# Patient Record
Sex: MALE | Race: BLACK OR AFRICAN AMERICAN | NOT HISPANIC OR LATINO | Employment: UNEMPLOYED | URBAN - METROPOLITAN AREA
[De-identification: names, ages, dates, MRNs, and addresses within clinical notes are randomized per-mention and may not be internally consistent; named-entity substitution may affect disease eponyms.]

---

## 2017-05-03 ENCOUNTER — HOSPITAL ENCOUNTER (EMERGENCY)
Facility: OTHER | Age: 32
Discharge: HOME | End: 2017-05-03
Attending: EMERGENCY MEDICINE
Payer: MEDICAID

## 2017-05-03 VITALS
HEART RATE: 97 BPM | HEIGHT: 71 IN | TEMPERATURE: 98 F | DIASTOLIC BLOOD PRESSURE: 101 MMHG | SYSTOLIC BLOOD PRESSURE: 172 MMHG | BODY MASS INDEX: 41.54 KG/M2 | RESPIRATION RATE: 15 BRPM | OXYGEN SATURATION: 95 % | WEIGHT: 296.75 LBS

## 2017-05-03 DIAGNOSIS — F22 DELUSIONS OF PARASITOSIS: Primary | ICD-10-CM

## 2017-05-03 PROCEDURE — 99283 EMERGENCY DEPT VISIT LOW MDM: CPT

## 2017-05-03 RX ORDER — HYDROXYZINE HYDROCHLORIDE 25 MG/1
25 TABLET, FILM COATED ORAL EVERY 4 HOURS PRN
Qty: 20 TABLET | Refills: 0 | Status: SHIPPED | OUTPATIENT
Start: 2017-05-03 | End: 2017-09-11

## 2017-05-03 RX ORDER — PERMETHRIN 50 MG/G
CREAM TOPICAL
Qty: 60 G | Refills: 1 | Status: SHIPPED | OUTPATIENT
Start: 2017-05-03 | End: 2017-09-11

## 2017-05-03 NOTE — ED AVS SNAPSHOT
[facility]  [address]  [place] [address]               JOSE Miller   5/3/2017  8:18 PM   ED    Description:  Male : 1985   Department:  [facility]           Your Care was Coordinated By:     Provider Role From To    Guadalupe Ngo MD Attending Provider 17 --    Kylee Beck PA-C Physician Assistant 17 --      Reason for Visit     Scabies           Diagnoses this Visit        Comments    Delusions of parasitosis    -  Primary       ED Disposition     None           To Do List           Follow-up Information     Follow up with [facility] In 2 days.    Specialties:  Behavioral Health, Psychiatry    Contact information:    [address] [id number] [phone]         These Medications        Disp Refills Start End    permethrin (ELIMITE) 5 % cream 60 g 1 5/3/2017     Apply from chin to toes. Wash off in 1 hour. Repeat in 1 week if symptoms continue    hydrOXYzine HCl (ATARAX) 25 MG tablet 20 tablet 0 5/3/2017     Take 1 tablet (25 mg total) by mouth every 4 (four) hours as needed for Itching. - Oral      [facility]     [facility] [place] [facility] Nurse Care Line -  Assistance  Unless otherwise directed by your provider, please contact [facility], our nurse care line that is available for  assistance.     Registered nurses in the [facility] provide: appointment scheduling, clinical advisement, health education, and other advisory services.  Call: [phone] (toll free)               Medications           Message regarding Medications     Verify the changes and/or additions to your medication regime listed below are the same as discussed with your clinician today.  If any of these changes or additions are incorrect, please notify your healthcare provider.        START taking these NEW medications        Refills    permethrin (ELIMITE) 5 % cream 1    Sig: Apply from chin  "to toes. Wash off in 1 hour. Repeat in 1 week if symptoms continue    Class: Print    hydrOXYzine HCl (ATARAX) 25 MG tablet 0    Sig: Take 1 tablet (25 mg total) by mouth every 4 (four) hours as needed for Itching.    Class: Print    Route: Oral           Verify that the below list of medications is an accurate representation of the medications you are currently taking.  If none reported, the list may be blank. If incorrect, please contact your healthcare provider. Carry this list with you in case of emergency.           Current Medications     hydrOXYzine HCl (ATARAX) 25 MG tablet Take 1 tablet (25 mg total) by mouth every 4 (four) hours as needed for Itching.    ibuprofen (ADVIL,MOTRIN) 800 MG tablet Take 1 tablet (800 mg total) by mouth every 6 (six) hours as needed for Pain.    permethrin (ELIMITE) 5 % cream Apply from chin to toes. Wash off in 1 hour. Repeat in 1 week if symptoms continue           Clinical Reference Information           Your Vitals Were     BP Pulse Temp Resp Height Weight    172/101 (BP Location: Left arm, Patient Position: Sitting) 97 97.8 °F (36.6 °C) (Oral) 15 5' 11" (1.803 m) 134.6 kg (296 lb 11.8 oz)    SpO2 BMI             95% 41.39 kg/m2         Allergies as of 5/3/2017     No Known Allergies      Immunizations Administered on Date of Encounter - 5/3/2017     None      ED Micro, Lab, POCT     None      ED Imaging Orders     None      Discharge References/Attachments     SCABIES (ENGLISH)      [facility] Sign-Up     Activating your [facility] account is as easy as 1-2-3!     1) Visit [email or web address], select Sign Up Now, enter this activation code and your date of birth, then select Next.  [email or web address]  Expires: 6/17/2017  8:27 PM      2) Create a username and password to use when you visit [facility] in the future and select a security question in case you lose your password and select Next.    3) Enter your e-mail address and click Sign Up!    Additional Information  If you have " questions, please e-mail [email or web address] or call [phone] to talk to our [facility] staff. Remember, [employer] is NOT to be used for urgent needs. For medical emergencies, dial 911.         Smoking Cessation     If you would like to quit smoking:   You may be eligible for free services if you are a [place] resident and started smoking cigarettes before September 1, 1988.  Call the Smoking Cessation Trust (SCT) toll free at (790) 084-1294 or (704) 249-0952.   Call 8-459-QUIT-NOW if you do not meet the above criteria.   Contact us via email: [email or web address]   View our website for more information: [email or web address]         [facility] complies with applicable Federal civil rights laws and does not discriminate on the basis of race, color, national origin, age, disability, or sex.        Language Assistance Services     ATTENTION: Language assistance services are available, free of charge. Please call [phone].      ATENCIÓN: Si habla español, tiene a rivas disposición servicios gratuitos de asistencia lingüística. Llame al [phone].     CHÚ Ý: N?u b?n nói Ti?ng Vi?t, có các d?ch v? h? tr? ngôn ng? mi?n phí dành cho b?n. G?i s? [phone].

## 2017-05-04 NOTE — ED TRIAGE NOTES
Pt reports feeling bugs crawling on him. Pt reports bought a lice treatment and saw little black bugs crawling after. Pt reports having a bath today and feeling as if they were still crawling on him. No bumps or rash noted.

## 2017-05-04 NOTE — ED PROVIDER NOTES
"Encounter Date: 5/3/2017       History     Chief Complaint   Patient presents with    Scabies     states he has something crawling on him since yesterday, no skin eruptions noted, saw tiny black insects crawling on him     Review of patient's allergies indicates:  No Known Allergies  HPI Comments: 31-year-old obese male with asthma presents to the emergency department with complaints of itching and feeling like something is crawling on him.  He states it started yesterday.  He denies any changes in soaps, detergents or lotions.  He states that he sees tiny bugs crawling on him.  He admits to treating with lice shampoo and shaving his head.  He states that he feels like his symptoms worsened.he states that he is currently living with his family who deny any symptoms.    The history is provided by the patient.     Past Medical History:   Diagnosis Date    Asthma     pt states he "grew out of it" and no longer has asthma.     History reviewed. No pertinent surgical history.  History reviewed. No pertinent family history.  Social History   Substance Use Topics    Smoking status: Current Every Day Smoker    Smokeless tobacco: None    Alcohol use No     Review of Systems   Constitutional: Negative for chills and fever.   HENT: Negative for sore throat.    Respiratory: Negative for shortness of breath.    Cardiovascular: Negative for chest pain.   Gastrointestinal: Negative for nausea and vomiting.   Genitourinary: Negative for dysuria.   Musculoskeletal: Negative for back pain.   Skin: Negative for rash.        pruritus   Neurological: Negative for weakness.   Hematological: Does not bruise/bleed easily.       Physical Exam   Initial Vitals   BP Pulse Resp Temp SpO2   05/03/17 1945 05/03/17 1945 05/03/17 1945 05/03/17 1945 05/03/17 1945   172/101 97 15 97.8 °F (36.6 °C) 95 %     Physical Exam    Nursing note and vitals reviewed.  Constitutional: He appears well-developed and well-nourished. He is not diaphoretic. He " is Obese .  Non-toxic appearance. No distress.   HENT:   Head: Normocephalic and atraumatic.   Right Ear: External ear normal.   Left Ear: External ear normal.   Nose: Nose normal.   Mouth/Throat: Oropharynx is clear and moist.   Eyes: Conjunctivae, EOM and lids are normal. Pupils are equal, round, and reactive to light. No scleral icterus.   Neck: Normal range of motion and phonation normal. Neck supple.   Cardiovascular: Normal rate, regular rhythm and normal heart sounds. Exam reveals no gallop and no friction rub.    No murmur heard.  Pulmonary/Chest: Breath sounds normal. No respiratory distress. He has no wheezes. He has no rhonchi. He has no rales.   Abdominal: Normal appearance.   Musculoskeletal: Normal range of motion.   No obvious deformities, moving all extremities, normal gait   Neurological: He is alert and oriented to person, place, and time. He has normal strength and normal reflexes. No sensory deficit.   Skin: Skin is warm, dry and intact. No lesion and no rash noted. No erythema.   No rash.  Mild excoriations noted to the wrists.  Skin is dry.   Psychiatric: He has a normal mood and affect. His speech is normal and behavior is normal. Judgment normal. Cognition and memory are normal.         ED Course   Procedures  Labs Reviewed - No data to display          Medical Decision Making:   History:   Old Medical Records: I decided to obtain old medical records.  Initial Assessment:   31-year-old male with complaints consistent with delusions of parasitosis.  Afebrile neurovascularly intact.  Elevated blood pressure with no known history of hypertension.  He is alert, healthy and nontoxic appearing.  Patient states that he feels like something is crawling on him.  No obvious parasites noted.  No obvious rash.  Will cover for possible scabies.  ED Management:  He is stable will be discharged home with a prescription for Atarax and permethrin cream.  He is to follow-up with a primary care physician in the  next 48 hours or return for any worsening symptoms.  He states understanding.  This patient was discussed with the attending physician who agrees with treatment plan.  Other:   I have discussed this case with another health care provider.       <> Summary of the Discussion: Jeni  This note was created using Dragon Medical dictation.  There may be typographical errors secondary to dictation.                     ED Course     Clinical Impression:     1. Delusions of parasitosis        Disposition:   Disposition: Discharged  Condition: Stable       Kylee Beck PA-C  05/03/17 2031

## 2017-06-20 ENCOUNTER — HOSPITAL ENCOUNTER (EMERGENCY)
Facility: OTHER | Age: 32
Discharge: LEFT BEFORE DISCHARGE | End: 2017-06-20
Attending: EMERGENCY MEDICINE

## 2017-06-20 VITALS
RESPIRATION RATE: 20 BRPM | WEIGHT: 291.88 LBS | SYSTOLIC BLOOD PRESSURE: 167 MMHG | OXYGEN SATURATION: 96 % | HEIGHT: 71 IN | BODY MASS INDEX: 40.86 KG/M2 | HEART RATE: 105 BPM | TEMPERATURE: 98 F | DIASTOLIC BLOOD PRESSURE: 108 MMHG

## 2017-06-20 DIAGNOSIS — F22 DELUSIONS OF PARASITOSIS: Primary | ICD-10-CM

## 2017-06-20 PROCEDURE — 99282 EMERGENCY DEPT VISIT SF MDM: CPT

## 2017-06-20 NOTE — ED NOTES
"Pt states "bugs are crawling all over me man", "look at these things crawling all over the table". Pt pointing to table saying, "you see them?", no bugs visualized on patient or examination table. No rash noted on arms, trunk, or back.   "

## 2017-06-20 NOTE — ED PROVIDER NOTES
"Encounter Date: 6/20/2017    SCRIBE #1 NOTE: I, Chelsy Escudero, am scribing for, and in the presence of,  Dr. Venegas. I have scribed the entire note.       History     Chief Complaint   Patient presents with    bed bugs     pt states he has something crawling all over him     Review of patient's allergies indicates:  No Known Allergies  Time seen by provider: 4:02 AM    This is a 32 y.o. male who presents with complaint of "little things crawling on me" since last night. He notes associated itching. He reports sensation on his back, scalp, and legs. He notes concern for bed bugs. He reports he is living with family members who deny similar symptoms.       The history is provided by the patient.     Past Medical History:   Diagnosis Date    Asthma     pt states he "grew out of it" and no longer has asthma.     History reviewed. No pertinent surgical history.  History reviewed. No pertinent family history.  Social History   Substance Use Topics    Smoking status: Current Every Day Smoker    Smokeless tobacco: Not on file    Alcohol use No     Review of Systems   Constitutional: Negative for diaphoresis and fever.        Sensation of "little things crawling on me."   HENT: Negative for congestion and sore throat.    Eyes: Negative for pain.   Respiratory: Negative for cough and shortness of breath.    Cardiovascular: Negative for chest pain.   Gastrointestinal: Negative for diarrhea, nausea and vomiting.   Musculoskeletal: Negative for myalgias.        Itchy skin.   Skin: Negative for color change and rash.   Neurological: Negative for dizziness.   Psychiatric/Behavioral: Negative for behavioral problems and confusion.       Physical Exam     Initial Vitals [06/20/17 0359]   BP Pulse Resp Temp SpO2   (!) 167/108 105 20 98 °F (36.7 °C) 96 %     Physical Exam    Constitutional: He appears well-developed and well-nourished. He is not diaphoretic. No distress.   HENT:   Head: Normocephalic and atraumatic.   Eyes: " Conjunctivae are normal. Pupils are equal, round, and reactive to light.   Neck: Normal range of motion. Neck supple.   Cardiovascular: Normal rate, regular rhythm and normal heart sounds. Exam reveals no gallop and no friction rub.    No murmur heard.  Pulmonary/Chest: Breath sounds normal. No respiratory distress. He has no wheezes. He has no rhonchi. He has no rales.   Musculoskeletal: Normal range of motion.   Neurological: He is alert and oriented to person, place, and time. No sensory deficit.   Skin: Skin is warm and dry. No rash noted. No erythema.   No evidence of rash between hands, wrists, flexor regions, or scalp.         ED Course   Procedures  Labs Reviewed - No data to display   Imaging Results    None                    Additional MDM:   Comments: 31 y/o male presents c/o bugs crawling on him after staying with his uncle.  He slapped his head and face and subsequently looked on the exam table insisting that he could see the bugs crawling on the paper.  There were no bugs visible to myself or the nurse.  In addition he has no visible insect bites or rash.  He became very agitated when I explained to him that there were no bugs on the exam table.  At that point he stated he was going to [place] since we couldn't help him.  Of note, he had a similar visit last month..          Scribe Attestation:   Scribe #1: I performed the above scribed service and the documentation accurately describes the services I performed. I attest to the accuracy of the note.    Attending Attestation:           Physician Attestation for Scribe:  Physician Attestation Statement for Scribe #1: I, Dr. Venegas, reviewed documentation, as scribed by Chelsy Escudero in my presence, and it is both accurate and complete.                 ED Course     Clinical Impression:     1. Delusions of parasitosis              Amalia Venegas MD  06/20/17 0497

## 2017-06-20 NOTE — ED NOTES
Pt c/o something crawling on him when he's in bed w/ his girlfriend. Nothing observed crawling on his skin. Pt is A & O x 3, denies SOB, fever, chills and N/V/D. No obvious respiratory distress noted. Respirations are even and unlabored. NO nasal flaring and no use of accessory muscles. Pt is able to maintain own airway. Skin is warm and dry w/ pink mucosa. Skin is not cyanotic,clammy or diaphoretic. No redness or flushing to the face. VS. LISSET x 3mm. No JVD. BBS- CTA w/out rales, rhonchi or wheezing. All fields equal upon auscultation. =chest rise observed. Abd- SNT. BS x 4 quadrants. Pt denies dysuria and constipation. PSM x 4 exts. MARTE w/difficulty. +2 pulses x 4 exts. No swelling, redness, difference in temperature or deformity to exts x 4. Bed is locked and in the low position w/ the side rails up and locked for safety. Call bell @ BS. Will continue to monitor closely.

## 2017-06-20 NOTE — ED NOTES
"Pt raising voice stating, "Man, I'm going to [place]. This is ridiculous that y'all can't do anything for me". Pt eloped.  "

## 2017-08-16 ENCOUNTER — CLINICAL SUPPORT (OUTPATIENT)
Dept: OCCUPATIONAL MEDICINE | Facility: CLINIC | Age: 32
End: 2017-08-16

## 2017-08-16 DIAGNOSIS — Z11.1 VISIT FOR TB SKIN TEST: Primary | ICD-10-CM

## 2017-08-16 PROCEDURE — 86580 TB INTRADERMAL TEST: CPT | Mod: S$GLB,,,

## 2017-09-12 ENCOUNTER — HOSPITAL ENCOUNTER (INPATIENT)
Facility: HOSPITAL | Age: 32
LOS: 3 days | Discharge: HOME | DRG: 638 | End: 2017-09-16
Attending: EMERGENCY MEDICINE | Admitting: EMERGENCY MEDICINE
Payer: MEDICAID

## 2017-09-12 DIAGNOSIS — E13.10 DIABETIC KETOACIDOSIS WITHOUT COMA ASSOCIATED WITH OTHER SPECIFIED DIABETES MELLITUS: Primary | ICD-10-CM

## 2017-09-12 DIAGNOSIS — I10 MALIGNANT HYPERTENSION: ICD-10-CM

## 2017-09-12 DIAGNOSIS — E87.5 HYPERKALEMIA: ICD-10-CM

## 2017-09-12 DIAGNOSIS — N17.9 ACUTE RENAL FAILURE, UNSPECIFIED ACUTE RENAL FAILURE TYPE: ICD-10-CM

## 2017-09-12 DIAGNOSIS — E11.9 DIABETES MELLITUS, NEW ONSET: ICD-10-CM

## 2017-09-12 LAB
DELSYS: ABNORMAL
HCO3 UR-SCNC: 8.1 MMOL/L (ref 24–28)
PCO2 BLDA: 26.5 MMHG (ref 35–45)
PH SMN: 7.09 [PH] (ref 7.35–7.45)
PO2 BLDA: 28 MMHG (ref 40–60)
POC BE: -20 MMOL/L
POC SATURATED O2: 34 % (ref 95–100)
POC TCO2: 9 MMOL/L (ref 24–29)
POCT GLUCOSE: 337 MG/DL (ref 70–110)
SAMPLE: ABNORMAL

## 2017-09-12 PROCEDURE — 82803 BLOOD GASES ANY COMBINATION: CPT

## 2017-09-12 PROCEDURE — 83525 ASSAY OF INSULIN: CPT

## 2017-09-12 PROCEDURE — 85025 COMPLETE CBC W/AUTO DIFF WBC: CPT | Mod: 91

## 2017-09-12 PROCEDURE — 80320 DRUG SCREEN QUANTALCOHOLS: CPT

## 2017-09-12 PROCEDURE — 84484 ASSAY OF TROPONIN QUANT: CPT | Mod: 91

## 2017-09-12 PROCEDURE — 86341 ISLET CELL ANTIBODY: CPT

## 2017-09-12 PROCEDURE — 99291 CRITICAL CARE FIRST HOUR: CPT | Mod: 25

## 2017-09-12 PROCEDURE — 20000000 HC ICU ROOM

## 2017-09-12 PROCEDURE — 83735 ASSAY OF MAGNESIUM: CPT

## 2017-09-12 PROCEDURE — 83880 ASSAY OF NATRIURETIC PEPTIDE: CPT

## 2017-09-12 PROCEDURE — 82962 GLUCOSE BLOOD TEST: CPT

## 2017-09-12 PROCEDURE — 99900035 HC TECH TIME PER 15 MIN (STAT)

## 2017-09-12 PROCEDURE — 84681 ASSAY OF C-PEPTIDE: CPT

## 2017-09-12 PROCEDURE — 83930 ASSAY OF BLOOD OSMOLALITY: CPT

## 2017-09-12 PROCEDURE — 83690 ASSAY OF LIPASE: CPT | Mod: 91

## 2017-09-12 PROCEDURE — 82010 KETONE BODYS QUAN: CPT

## 2017-09-12 PROCEDURE — 93005 ELECTROCARDIOGRAM TRACING: CPT

## 2017-09-12 PROCEDURE — 80053 COMPREHEN METABOLIC PANEL: CPT | Mod: 91

## 2017-09-12 RX ORDER — HALOPERIDOL 5 MG/ML
5 INJECTION INTRAMUSCULAR
Status: DISCONTINUED | OUTPATIENT
Start: 2017-09-12 | End: 2017-09-13

## 2017-09-12 RX ORDER — SODIUM CHLORIDE 9 MG/ML
1000 INJECTION, SOLUTION INTRAVENOUS
Status: COMPLETED | OUTPATIENT
Start: 2017-09-12 | End: 2017-09-13

## 2017-09-13 PROBLEM — F11.11 HISTORY OF HEROIN ABUSE: Chronic | Status: ACTIVE | Noted: 2017-09-13

## 2017-09-13 PROBLEM — I10 ESSENTIAL HYPERTENSION: Chronic | Status: ACTIVE | Noted: 2017-09-13

## 2017-09-13 PROBLEM — F11.21: Status: ACTIVE | Noted: 2017-09-13

## 2017-09-13 PROBLEM — E87.5 HYPERKALEMIA: Status: ACTIVE | Noted: 2017-09-13

## 2017-09-13 PROBLEM — Z91.148 NONCOMPLIANCE WITH MEDICATION REGIMEN: Chronic | Status: ACTIVE | Noted: 2017-09-13

## 2017-09-13 PROBLEM — F20.3 UNDIFFERENTIATED SCHIZOPHRENIA: Status: ACTIVE | Noted: 2017-09-13

## 2017-09-13 PROBLEM — F31.9 BIPOLAR 1 DISORDER: Chronic | Status: ACTIVE | Noted: 2017-09-13

## 2017-09-13 PROBLEM — R41.83 BORDERLINE INTELLECTUAL FUNCTIONING: Status: ACTIVE | Noted: 2017-09-13

## 2017-09-13 PROBLEM — N17.9 ACUTE RENAL FAILURE: Status: ACTIVE | Noted: 2017-09-13

## 2017-09-13 PROBLEM — E11.10 DIABETIC KETOACIDOSIS WITHOUT COMA: Status: ACTIVE | Noted: 2017-09-13

## 2017-09-13 PROBLEM — E11.9 DIABETES MELLITUS, NEW ONSET: Status: ACTIVE | Noted: 2017-09-13

## 2017-09-13 PROBLEM — Z72.0 TOBACCO ABUSE: Chronic | Status: ACTIVE | Noted: 2017-09-13

## 2017-09-13 LAB
ALBUMIN SERPL BCP-MCNC: 4.4 G/DL
ALP SERPL-CCNC: 119 U/L
ALT SERPL W/O P-5'-P-CCNC: 22 U/L
AMPHET+METHAMPHET UR QL: NEGATIVE
ANION GAP SERPL CALC-SCNC: 13 MMOL/L
ANION GAP SERPL CALC-SCNC: 15 MMOL/L
ANION GAP SERPL CALC-SCNC: 19 MMOL/L
ANION GAP SERPL CALC-SCNC: 21 MMOL/L
ANISOCYTOSIS BLD QL SMEAR: SLIGHT
AST SERPL-CCNC: 15 U/L
B-OH-BUTYR BLD STRIP-SCNC: 6.4 MMOL/L
BACTERIA #/AREA URNS HPF: NORMAL /HPF
BARBITURATES UR QL SCN>200 NG/ML: NEGATIVE
BASOPHILS # BLD AUTO: 0.01 K/UL
BASOPHILS # BLD AUTO: 0.01 K/UL
BASOPHILS NFR BLD: 0.1 %
BASOPHILS NFR BLD: 0.1 %
BENZODIAZ UR QL SCN>200 NG/ML: NEGATIVE
BILIRUB SERPL-MCNC: 0.6 MG/DL
BILIRUB UR QL STRIP: NEGATIVE
BNP SERPL-MCNC: <10 PG/ML
BUN SERPL-MCNC: 10 MG/DL
BUN SERPL-MCNC: 12 MG/DL
BUN SERPL-MCNC: 13 MG/DL
BUN SERPL-MCNC: 14 MG/DL
BZE UR QL SCN: NEGATIVE
C PEPTIDE SERPL-MCNC: 1.19 NG/ML
CALCIUM SERPL-MCNC: 9.5 MG/DL
CALCIUM SERPL-MCNC: 9.6 MG/DL
CALCIUM SERPL-MCNC: 9.6 MG/DL
CALCIUM SERPL-MCNC: 9.8 MG/DL
CANNABINOIDS UR QL SCN: NEGATIVE
CHLORIDE SERPL-SCNC: 104 MMOL/L
CHLORIDE SERPL-SCNC: 105 MMOL/L
CHLORIDE SERPL-SCNC: 106 MMOL/L
CHLORIDE SERPL-SCNC: 99 MMOL/L
CLARITY UR: CLEAR
CO2 SERPL-SCNC: 10 MMOL/L
CO2 SERPL-SCNC: 12 MMOL/L
CO2 SERPL-SCNC: 13 MMOL/L
CO2 SERPL-SCNC: 7 MMOL/L
COLOR UR: ABNORMAL
CREAT SERPL-MCNC: 1.3 MG/DL
CREAT SERPL-MCNC: 1.6 MG/DL
CREAT SERPL-MCNC: 1.8 MG/DL
CREAT SERPL-MCNC: 2.1 MG/DL
CREAT UR-MCNC: 32.7 MG/DL
DACRYOCYTES BLD QL SMEAR: ABNORMAL
DIFFERENTIAL METHOD: ABNORMAL
DIFFERENTIAL METHOD: ABNORMAL
EOSINOPHIL # BLD AUTO: 0 K/UL
EOSINOPHIL # BLD AUTO: 0 K/UL
EOSINOPHIL NFR BLD: 0 %
EOSINOPHIL NFR BLD: 0 %
ERYTHROCYTE [DISTWIDTH] IN BLOOD BY AUTOMATED COUNT: 13.5 %
ERYTHROCYTE [DISTWIDTH] IN BLOOD BY AUTOMATED COUNT: 13.5 %
EST. GFR  (AFRICAN AMERICAN): 46 ML/MIN/1.73 M^2
EST. GFR  (AFRICAN AMERICAN): 56 ML/MIN/1.73 M^2
EST. GFR  (AFRICAN AMERICAN): >60 ML/MIN/1.73 M^2
EST. GFR  (AFRICAN AMERICAN): >60 ML/MIN/1.73 M^2
EST. GFR  (NON AFRICAN AMERICAN): 40 ML/MIN/1.73 M^2
EST. GFR  (NON AFRICAN AMERICAN): 48 ML/MIN/1.73 M^2
EST. GFR  (NON AFRICAN AMERICAN): 56 ML/MIN/1.73 M^2
EST. GFR  (NON AFRICAN AMERICAN): >60 ML/MIN/1.73 M^2
ETHANOL SERPL-MCNC: <10 MG/DL
GLUCOSE SERPL-MCNC: 154 MG/DL
GLUCOSE SERPL-MCNC: 225 MG/DL
GLUCOSE SERPL-MCNC: 251 MG/DL
GLUCOSE SERPL-MCNC: 461 MG/DL
GLUCOSE UR QL STRIP: ABNORMAL
HCT VFR BLD AUTO: 47.3 %
HCT VFR BLD AUTO: 47.5 %
HGB BLD-MCNC: 16.3 G/DL
HGB BLD-MCNC: 16.4 G/DL
HGB UR QL STRIP: ABNORMAL
HYALINE CASTS #/AREA URNS LPF: 0 /LPF
INSULIN COLLECTION INTERVAL: NORMAL
INSULIN SERPL-ACNC: 5.3 UU/ML
KETONES UR QL STRIP: ABNORMAL
LEUKOCYTE ESTERASE UR QL STRIP: NEGATIVE
LIPASE SERPL-CCNC: 19 U/L
LYMPHOCYTES # BLD AUTO: 1.7 K/UL
LYMPHOCYTES # BLD AUTO: 3 K/UL
LYMPHOCYTES NFR BLD: 11.1 %
LYMPHOCYTES NFR BLD: 17.4 %
MAGNESIUM SERPL-MCNC: 2 MG/DL
MAGNESIUM SERPL-MCNC: 2.1 MG/DL
MAGNESIUM SERPL-MCNC: 2.2 MG/DL
MAGNESIUM SERPL-MCNC: 2.2 MG/DL
MCH RBC QN AUTO: 31.2 PG
MCH RBC QN AUTO: 31.2 PG
MCHC RBC AUTO-ENTMCNC: 34.5 G/DL
MCHC RBC AUTO-ENTMCNC: 34.5 G/DL
MCV RBC AUTO: 91 FL
MCV RBC AUTO: 91 FL
METHADONE UR QL SCN>300 NG/ML: NEGATIVE
MICROSCOPIC COMMENT: NORMAL
MONOCYTES # BLD AUTO: 0.7 K/UL
MONOCYTES # BLD AUTO: 1.4 K/UL
MONOCYTES NFR BLD: 4.7 %
MONOCYTES NFR BLD: 7.9 %
NEUTROPHILS # BLD AUTO: 12.7 K/UL
NEUTROPHILS # BLD AUTO: 13 K/UL
NEUTROPHILS NFR BLD: 74.6 %
NEUTROPHILS NFR BLD: 84.4 %
NITRITE UR QL STRIP: NEGATIVE
OPIATES UR QL SCN: NEGATIVE
OSMOLALITY SERPL: 303 MOSM/KG
OVALOCYTES BLD QL SMEAR: ABNORMAL
PCP UR QL SCN>25 NG/ML: NEGATIVE
PH UR STRIP: 5 [PH] (ref 5–8)
PHOSPHATE SERPL-MCNC: 1.4 MG/DL
PHOSPHATE SERPL-MCNC: 1.8 MG/DL
PHOSPHATE SERPL-MCNC: 2.2 MG/DL
PLATELET # BLD AUTO: 367 K/UL
PLATELET # BLD AUTO: 369 K/UL
PMV BLD AUTO: 12.7 FL
PMV BLD AUTO: 12.8 FL
POCT GLUCOSE: 132 MG/DL (ref 70–110)
POCT GLUCOSE: 143 MG/DL (ref 70–110)
POCT GLUCOSE: 149 MG/DL (ref 70–110)
POCT GLUCOSE: 183 MG/DL (ref 70–110)
POCT GLUCOSE: 208 MG/DL (ref 70–110)
POCT GLUCOSE: 210 MG/DL (ref 70–110)
POCT GLUCOSE: 212 MG/DL (ref 70–110)
POCT GLUCOSE: 221 MG/DL (ref 70–110)
POCT GLUCOSE: 232 MG/DL (ref 70–110)
POCT GLUCOSE: 252 MG/DL (ref 70–110)
POCT GLUCOSE: 263 MG/DL (ref 70–110)
POCT GLUCOSE: 277 MG/DL (ref 70–110)
POCT GLUCOSE: 283 MG/DL (ref 70–110)
POCT GLUCOSE: 302 MG/DL (ref 70–110)
POIKILOCYTOSIS BLD QL SMEAR: SLIGHT
POTASSIUM SERPL-SCNC: 4.5 MMOL/L
POTASSIUM SERPL-SCNC: 4.6 MMOL/L
POTASSIUM SERPL-SCNC: 5.2 MMOL/L
POTASSIUM SERPL-SCNC: 6.1 MMOL/L
PROT SERPL-MCNC: 9.7 G/DL
PROT UR QL STRIP: ABNORMAL
RBC # BLD AUTO: 5.22 M/UL
RBC # BLD AUTO: 5.25 M/UL
RBC #/AREA URNS HPF: 1 /HPF (ref 0–4)
SODIUM SERPL-SCNC: 127 MMOL/L
SODIUM SERPL-SCNC: 130 MMOL/L
SODIUM SERPL-SCNC: 132 MMOL/L
SODIUM SERPL-SCNC: 135 MMOL/L
SODIUM UR-SCNC: 95 MMOL/L
SP GR UR STRIP: 1.02 (ref 1–1.03)
TOXICOLOGY INFORMATION: NORMAL
TROPONIN I SERPL DL<=0.01 NG/ML-MCNC: <0.006 NG/ML
URN SPEC COLLECT METH UR: ABNORMAL
UROBILINOGEN UR STRIP-ACNC: NEGATIVE EU/DL
WBC # BLD AUTO: 15.4 K/UL
WBC # BLD AUTO: 17.04 K/UL
WBC #/AREA URNS HPF: 0 /HPF (ref 0–5)
YEAST URNS QL MICRO: NORMAL

## 2017-09-13 PROCEDURE — 83735 ASSAY OF MAGNESIUM: CPT

## 2017-09-13 PROCEDURE — 96366 THER/PROPH/DIAG IV INF ADDON: CPT

## 2017-09-13 PROCEDURE — 96375 TX/PRO/DX INJ NEW DRUG ADDON: CPT

## 2017-09-13 PROCEDURE — 93005 ELECTROCARDIOGRAM TRACING: CPT

## 2017-09-13 PROCEDURE — 36415 COLL VENOUS BLD VENIPUNCTURE: CPT

## 2017-09-13 PROCEDURE — 25000003 PHARM REV CODE 250: Performed by: EMERGENCY MEDICINE

## 2017-09-13 PROCEDURE — 90792 PSYCH DIAG EVAL W/MED SRVCS: CPT | Mod: AF,HB,S$PBB, | Performed by: PSYCHIATRY & NEUROLOGY

## 2017-09-13 PROCEDURE — 25000003 PHARM REV CODE 250: Performed by: HOSPITALIST

## 2017-09-13 PROCEDURE — 82962 GLUCOSE BLOOD TEST: CPT

## 2017-09-13 PROCEDURE — 63600175 PHARM REV CODE 636 W HCPCS: Performed by: EMERGENCY MEDICINE

## 2017-09-13 PROCEDURE — S4991 NICOTINE PATCH NONLEGEND: HCPCS | Performed by: INTERNAL MEDICINE

## 2017-09-13 PROCEDURE — 80307 DRUG TEST PRSMV CHEM ANLYZR: CPT

## 2017-09-13 PROCEDURE — 80048 BASIC METABOLIC PNL TOTAL CA: CPT | Mod: 91

## 2017-09-13 PROCEDURE — 84300 ASSAY OF URINE SODIUM: CPT

## 2017-09-13 PROCEDURE — 63600175 PHARM REV CODE 636 W HCPCS: Performed by: HOSPITALIST

## 2017-09-13 PROCEDURE — 25000003 PHARM REV CODE 250: Performed by: INTERNAL MEDICINE

## 2017-09-13 PROCEDURE — 84100 ASSAY OF PHOSPHORUS: CPT | Mod: 91

## 2017-09-13 PROCEDURE — 96365 THER/PROPH/DIAG IV INF INIT: CPT

## 2017-09-13 PROCEDURE — 81000 URINALYSIS NONAUTO W/SCOPE: CPT

## 2017-09-13 PROCEDURE — 63600175 PHARM REV CODE 636 W HCPCS: Performed by: INTERNAL MEDICINE

## 2017-09-13 PROCEDURE — S5010 5% DEXTROSE AND 0.45% SALINE: HCPCS | Performed by: INTERNAL MEDICINE

## 2017-09-13 PROCEDURE — 20000000 HC ICU ROOM

## 2017-09-13 PROCEDURE — 85025 COMPLETE CBC W/AUTO DIFF WBC: CPT

## 2017-09-13 RX ORDER — DIVALPROEX SODIUM 250 MG/1
500 TABLET, DELAYED RELEASE ORAL EVERY 8 HOURS
Status: DISCONTINUED | OUTPATIENT
Start: 2017-09-13 | End: 2017-09-16 | Stop reason: HOSPADM

## 2017-09-13 RX ORDER — DEXTROSE MONOHYDRATE AND SODIUM CHLORIDE 5; .45 G/100ML; G/100ML
INJECTION, SOLUTION INTRAVENOUS CONTINUOUS
Status: DISCONTINUED | OUTPATIENT
Start: 2017-09-13 | End: 2017-09-13

## 2017-09-13 RX ORDER — SODIUM CHLORIDE 9 MG/ML
INJECTION, SOLUTION INTRAVENOUS CONTINUOUS
Status: DISCONTINUED | OUTPATIENT
Start: 2017-09-13 | End: 2017-09-14

## 2017-09-13 RX ORDER — IBUPROFEN 200 MG
16 TABLET ORAL
Status: DISCONTINUED | OUTPATIENT
Start: 2017-09-13 | End: 2017-09-16 | Stop reason: HOSPADM

## 2017-09-13 RX ORDER — ONDANSETRON 2 MG/ML
8 INJECTION INTRAMUSCULAR; INTRAVENOUS EVERY 8 HOURS PRN
Status: DISCONTINUED | OUTPATIENT
Start: 2017-09-13 | End: 2017-09-16 | Stop reason: HOSPADM

## 2017-09-13 RX ORDER — DEXTROSE MONOHYDRATE 100 MG/ML
1000 INJECTION, SOLUTION INTRAVENOUS
Status: DISCONTINUED | OUTPATIENT
Start: 2017-09-13 | End: 2017-09-13

## 2017-09-13 RX ORDER — ACETAMINOPHEN 500 MG
500 TABLET ORAL EVERY 6 HOURS PRN
Status: DISCONTINUED | OUTPATIENT
Start: 2017-09-13 | End: 2017-09-16 | Stop reason: HOSPADM

## 2017-09-13 RX ORDER — ENOXAPARIN SODIUM 100 MG/ML
30 INJECTION SUBCUTANEOUS EVERY 24 HOURS
Status: DISCONTINUED | OUTPATIENT
Start: 2017-09-13 | End: 2017-09-13

## 2017-09-13 RX ORDER — DIPHENHYDRAMINE HYDROCHLORIDE 50 MG/ML
25 INJECTION INTRAMUSCULAR; INTRAVENOUS
Status: COMPLETED | OUTPATIENT
Start: 2017-09-13 | End: 2017-09-13

## 2017-09-13 RX ORDER — IBUPROFEN 200 MG
1 TABLET ORAL DAILY
Status: DISCONTINUED | OUTPATIENT
Start: 2017-09-13 | End: 2017-09-14

## 2017-09-13 RX ORDER — RAMELTEON 8 MG/1
8 TABLET ORAL NIGHTLY PRN
Status: DISCONTINUED | OUTPATIENT
Start: 2017-09-13 | End: 2017-09-16 | Stop reason: HOSPADM

## 2017-09-13 RX ORDER — GLUCAGON 1 MG
1 KIT INJECTION
Status: DISCONTINUED | OUTPATIENT
Start: 2017-09-13 | End: 2017-09-16 | Stop reason: HOSPADM

## 2017-09-13 RX ORDER — CLONIDINE HYDROCHLORIDE 0.1 MG/1
0.2 TABLET ORAL 3 TIMES DAILY PRN
Status: DISCONTINUED | OUTPATIENT
Start: 2017-09-13 | End: 2017-09-16 | Stop reason: HOSPADM

## 2017-09-13 RX ORDER — HYDRALAZINE HYDROCHLORIDE 20 MG/ML
10 INJECTION INTRAMUSCULAR; INTRAVENOUS
Status: COMPLETED | OUTPATIENT
Start: 2017-09-13 | End: 2017-09-13

## 2017-09-13 RX ORDER — SODIUM CHLORIDE 9 MG/ML
INJECTION, SOLUTION INTRAVENOUS CONTINUOUS
Status: DISCONTINUED | OUTPATIENT
Start: 2017-09-13 | End: 2017-09-13

## 2017-09-13 RX ORDER — HEPARIN SODIUM 5000 [USP'U]/ML
5000 INJECTION, SOLUTION INTRAVENOUS; SUBCUTANEOUS EVERY 12 HOURS
Status: DISCONTINUED | OUTPATIENT
Start: 2017-09-13 | End: 2017-09-13

## 2017-09-13 RX ORDER — IBUPROFEN 200 MG
24 TABLET ORAL
Status: DISCONTINUED | OUTPATIENT
Start: 2017-09-13 | End: 2017-09-16 | Stop reason: HOSPADM

## 2017-09-13 RX ORDER — ENOXAPARIN SODIUM 100 MG/ML
40 INJECTION SUBCUTANEOUS EVERY 24 HOURS
Status: DISCONTINUED | OUTPATIENT
Start: 2017-09-13 | End: 2017-09-16 | Stop reason: HOSPADM

## 2017-09-13 RX ORDER — ZIPRASIDONE HYDROCHLORIDE 20 MG/1
40 CAPSULE ORAL NIGHTLY
Status: DISCONTINUED | OUTPATIENT
Start: 2017-09-13 | End: 2017-09-16 | Stop reason: HOSPADM

## 2017-09-13 RX ORDER — INSULIN ASPART 100 [IU]/ML
5 INJECTION, SOLUTION INTRAVENOUS; SUBCUTANEOUS
Status: DISCONTINUED | OUTPATIENT
Start: 2017-09-13 | End: 2017-09-14

## 2017-09-13 RX ORDER — METOCLOPRAMIDE HYDROCHLORIDE 5 MG/ML
10 INJECTION INTRAMUSCULAR; INTRAVENOUS
Status: COMPLETED | OUTPATIENT
Start: 2017-09-13 | End: 2017-09-13

## 2017-09-13 RX ORDER — CLONIDINE HYDROCHLORIDE 0.1 MG/1
0.2 TABLET ORAL 2 TIMES DAILY
Status: DISCONTINUED | OUTPATIENT
Start: 2017-09-13 | End: 2017-09-16 | Stop reason: HOSPADM

## 2017-09-13 RX ORDER — CLONIDINE HYDROCHLORIDE 0.1 MG/1
0.2 TABLET ORAL 2 TIMES DAILY
Status: DISCONTINUED | OUTPATIENT
Start: 2017-09-13 | End: 2017-09-13

## 2017-09-13 RX ORDER — INSULIN ASPART 100 [IU]/ML
1-10 INJECTION, SOLUTION INTRAVENOUS; SUBCUTANEOUS
Status: DISCONTINUED | OUTPATIENT
Start: 2017-09-13 | End: 2017-09-16 | Stop reason: HOSPADM

## 2017-09-13 RX ADMIN — HEPARIN SODIUM 5000 UNITS: 5000 INJECTION, SOLUTION INTRAVENOUS; SUBCUTANEOUS at 08:09

## 2017-09-13 RX ADMIN — HYDRALAZINE HYDROCHLORIDE 10 MG: 20 INJECTION INTRAMUSCULAR; INTRAVENOUS at 01:09

## 2017-09-13 RX ADMIN — INSULIN DETEMIR 10 UNITS: 100 INJECTION, SOLUTION SUBCUTANEOUS at 09:09

## 2017-09-13 RX ADMIN — DEXTROSE AND SODIUM CHLORIDE: 5; .45 INJECTION, SOLUTION INTRAVENOUS at 03:09

## 2017-09-13 RX ADMIN — ENOXAPARIN SODIUM 40 MG: 100 INJECTION SUBCUTANEOUS at 04:09

## 2017-09-13 RX ADMIN — INSULIN ASPART 4 UNITS: 100 INJECTION, SOLUTION INTRAVENOUS; SUBCUTANEOUS at 09:09

## 2017-09-13 RX ADMIN — INSULIN ASPART 5 UNITS: 100 INJECTION, SOLUTION INTRAVENOUS; SUBCUTANEOUS at 04:09

## 2017-09-13 RX ADMIN — NICOTINE 1 PATCH: 21 PATCH, EXTENDED RELEASE TRANSDERMAL at 08:09

## 2017-09-13 RX ADMIN — SODIUM CHLORIDE: 0.9 INJECTION, SOLUTION INTRAVENOUS at 02:09

## 2017-09-13 RX ADMIN — ACETAMINOPHEN 500 MG: 500 TABLET ORAL at 06:09

## 2017-09-13 RX ADMIN — SODIUM CHLORIDE 1000 ML: 0.9 INJECTION, SOLUTION INTRAVENOUS at 12:09

## 2017-09-13 RX ADMIN — SODIUM CHLORIDE: 0.9 INJECTION, SOLUTION INTRAVENOUS at 03:09

## 2017-09-13 RX ADMIN — ACETAMINOPHEN 500 MG: 500 TABLET ORAL at 07:09

## 2017-09-13 RX ADMIN — LORAZEPAM 2 MG: 2 INJECTION INTRAMUSCULAR; INTRAVENOUS at 12:09

## 2017-09-13 RX ADMIN — ZIPRASIDONE HYDROCHLORIDE 40 MG: 20 CAPSULE ORAL at 09:09

## 2017-09-13 RX ADMIN — CLONIDINE HYDROCHLORIDE 0.2 MG: 0.1 TABLET ORAL at 05:09

## 2017-09-13 RX ADMIN — SODIUM CHLORIDE 10 UNITS/HR: 9 INJECTION, SOLUTION INTRAVENOUS at 02:09

## 2017-09-13 RX ADMIN — DIVALPROEX SODIUM 500 MG: 250 TABLET, DELAYED RELEASE ORAL at 09:09

## 2017-09-13 RX ADMIN — ONDANSETRON 8 MG: 2 INJECTION INTRAMUSCULAR; INTRAVENOUS at 08:09

## 2017-09-13 RX ADMIN — METOCLOPRAMIDE 10 MG: 5 INJECTION, SOLUTION INTRAMUSCULAR; INTRAVENOUS at 12:09

## 2017-09-13 RX ADMIN — SODIUM CHLORIDE 10 UNITS/HR: 9 INJECTION, SOLUTION INTRAVENOUS at 12:09

## 2017-09-13 RX ADMIN — SODIUM CHLORIDE: 0.9 INJECTION, SOLUTION INTRAVENOUS at 10:09

## 2017-09-13 RX ADMIN — DIPHENHYDRAMINE HYDROCHLORIDE 25 MG: 50 INJECTION, SOLUTION INTRAMUSCULAR; INTRAVENOUS at 12:09

## 2017-09-13 RX ADMIN — INSULIN DETEMIR 10 UNITS: 100 INJECTION, SOLUTION SUBCUTANEOUS at 01:09

## 2017-09-13 NOTE — ASSESSMENT & PLAN NOTE
Has history of schizophrenia but because he is a limited historian, unable to get clear picture of which type and degree of severity.  Being that he [facility] in a [facility], he likely has a criminal link to his mental illness.  Restart Geodon at 40mg nightly and Depakote at 500mg TID for now.  He currently does not meet criteria for inpatient psychiatric admission.  Upon discharge, he will be able to return to [facility] for outpatient follow up.

## 2017-09-13 NOTE — PLAN OF CARE
Problem: Patient Care Overview  Goal: Plan of Care Review  Outcome: Ongoing (interventions implemented as appropriate)  Patient admitted to ICU via ED this shift with diagnosis of DKA. Remains on insulin gtt, titrated per protocol. Accuchecks q 1 hour. Fluids changed to D51/2NS @ 150cc/hr. Patient voided per urinal at approximately 0200 per ED. No BM. Turns/repositions self. Denies pain. No N/V noted. Remains free from hospital acquired falls and injuries.

## 2017-09-13 NOTE — SUBJECTIVE & OBJECTIVE
"     Patient History           Medical as of 9/13/2017     Past Medical History     Diagnosis Date Comments Source    Asthma -- pt states he "grew out of it" and no longer has asthma. Provider    History of psychiatric hospitalization -- -- Provider    Hx of psychiatric care -- -- Provider    Hypertension -- -- Provider    Psychiatric problem -- -- Provider    Therapy -- -- Provider          Pertinent Negatives     Diagnosis Date Noted Comments Source    Suicide attempt 9/13/2017 -- Provider                  Surgical as of 9/13/2017     Past Surgical History     Procedure Laterality Date Comments Source    FRACTURE SURGERY -- -- ankle Provider                  Family as of 9/13/2017     Problem Relation Name Age of Onset Comments Source    Diabetes Paternal Uncle -- -- -- Provider            Tobacco Use as of 9/13/2017     Smoking Status Smoking Start Date Smoking Quit Date Packs/day Years Used    Current Every Day Smoker -- -- -- --    Types Comments Smokeless Tobacco Status Smokeless Tobacco Quit Date Source     Cigarettes -- Never Used -- Provider            Alcohol Use as of 9/13/2017     Alcohol Use Drinks/Week Alcohol/Week Comments Source    No -- -- -- Provider            Drug Use as of 9/13/2017     Drug Use Types Frequency Comments Source    Yes  Heroin -- patient states stopped using 2 months ago, currently at [facility] Provider            Sexual Activity as of 9/13/2017     Sexually Active Birth Control Partners Comments Source    -- -- -- -- Provider            Activities of Daily Living as of 9/13/2017     Activities of Daily Living Question Response Comments Source    Patient feels they ought to cut down on drinking/drug use Not Asked -- Provider    Patient annoyed by others criticizing their drinking/drug use Not Asked -- Provider    Patient has felt bad or guilty about drinking/drug use Not Asked -- Provider    Patient has had a drink/used drugs as an eye opener in the AM Not Asked -- Provider          "   Social Documentation as of 9/13/2017    **None**           Occupational as of 9/13/2017    **None**           Socioeconomic as of 9/13/2017     Marital Status Spouse Name Number of Children Years Education Preferred Language Ethnicity Race Source    Single -- -- -- English /Black Black or  --         Pertinent History Q A Comments    as of 9/13/2017 Lives with  girlfriend    Place in Birth Order      Lives in home     Number of Siblings      Raised by      Legal Involvement      Childhood Trauma      Criminal History of arrest and incarceration previous psychiatric [facility] for 4 months    Financial Status disabled     Highest Level of Education unfinished high[employer] special education classes    Does patient have access to a firearm? No      Service No     Primary Leisure Activity      Spirituality non-practicing        Review of patient's allergies indicates:  No Known Allergies    No current facility-administered medications on file prior to encounter.      Current Outpatient Prescriptions on File Prior to Encounter   Medication Sig    cloNIDine (CATAPRES) 0.2 MG tablet Take 0.2 mg by mouth 2 (two) times daily.    metformin (FORTAMET) 500 mg 24 hr tablet Take 1 tablet (500 mg total) by mouth daily with breakfast.     Psychotherapeutics     Start     Stop Route Frequency Ordered    09/13/17 0401  ramelteon tablet 8 mg      -- Oral Nightly PRN 09/13/17 0302        Review of Systems   Constitutional: Negative for activity change and appetite change.   Respiratory: Negative for shortness of breath.    Cardiovascular: Negative for chest pain.   Gastrointestinal: Positive for nausea.   Musculoskeletal: Negative for myalgias.   Psychiatric/Behavioral: Negative for dysphoric mood, hallucinations, sleep disturbance and suicidal ideas.     Objective:     Vital Signs (Most Recent):  Temp: 98.3 °F (36.8 °C) (09/13/17 1115)  Pulse: 90 (09/13/17 1400)  Resp: 18 (09/13/17 1400)  BP: (!)  "165/86 (09/13/17 1400)  SpO2: 100 % (09/13/17 1400) Vital Signs (24h Range):  Temp:  [98 °F (36.7 °C)-98.8 °F (37.1 °C)] 98.3 °F (36.8 °C)  Pulse:  [] 90  Resp:  [16-34] 18  SpO2:  [99 %-100 %] 100 %  BP: (141-202)/() 165/86     Height: 5' 11" (180.3 cm)  Weight: 126.3 kg (278 lb 7.1 oz)  Body mass index is 38.83 kg/m².      Intake/Output Summary (Last 24 hours) at 09/13/17 1530  Last data filed at 09/13/17 1400   Gross per 24 hour   Intake          2665.19 ml   Output              850 ml   Net          1815.19 ml       Physical Exam   Psychiatric:   EXAMINATION    CONSTITUTIONAL  General Appearance: hospital attire    MUSCULOSKELETAL  Muscle Strength and Tone: normal  Abnormal Involuntary Movements: none noted or endorsed  Gait and Station: not observed    PSYCHIATRIC MENTAL STATUS EXAM   Level of Consciousness: awake and alert  Orientation: name, place, month, year, situation  Grooming: limited  Psychomotor Behavior: somewhat slowed  Speech: soft volume and tone; delayed rate  Language: no abnormalities  Mood: "OK"  Affect: blunted; almost restricted  Thought Process: concrete  Associations: intact but simple  Thought Content: denies suicidal/homicidal/psychosis  Memory: intact to recent and remote  Attention: diminished  Fund of Knowledge: intact to conversation  Insight: poor into mental illness and medications/compliance  Judgment: poor into medications and compliance            Significant Labs:   Last 24 Hours:   Recent Lab Results       09/13/17  1513 09/13/17  1225 09/13/17  1222 09/13/17  1008 09/13/17  0848      Benzodiazepines          Methadone metabolites          Phencyclidine          Albumin          Alcohol, Medical, Serum          Alkaline Phosphatase          ALT          Amphetamine Screen, Ur          Anion Gap  13        Aniso          Appearance, UA          AST          Bacteria, UA          Barbiturate Screen, Ur          Baso #          Basophil%          Beta-Hydroxybutyrate   "        Bilirubin (UA)          Total Bilirubin          BNP          BUN, Bld  10        Calcium  9.6        Chloride  105        CO2  12(L)        Cocaine (Metab.)          Color, UA          Creatinine  1.3        Creatinine, Random Ur          DelSys          Differential Method          eGFR if   >60        eGFR if non   >60  Comment:  Calculation used to obtain the estimated glomerular filtration  rate (eGFR) is the CKD-EPI equation. Since race is unknown   in our information system, the eGFR values for   -American and Non--American patients are given   for each creatinine result.          Eos #          Eosinophil%          Glucose  225(H)        Glucose, UA          Gran #          Gran%          Hematocrit          Hemoglobin          Hyaline Casts, UA          Insulin Collection Interval          Ketones, UA          Leukocytes, UA          Lipase          Lymph #          Lymph%          Magnesium  2.2        MCH          MCHC          MCV          Microscopic Comment          Mono #          Mono%          MPV          Nitrite, UA          Occult Blood UA          Opiate Scrn, Ur          Osmolality          Ovalocytes          pH, UA          Phosphorus  1.4(L)        Platelets          POC BE          POC HCO3          POC PCO2          POC PH          POC PO2          POC SATURATED O2          POC TCO2          POCT Glucose 221(H)  210(H) 263(H) 212(H)     Poik          Potassium  4.5  Comment:  Specimen slightly hemolyzed        Total Protein          Protein, UA          RBC          RBC, UA          RDW          Sample          Sodium  130(L)        Sodium Urine Random          Specific Gravity, UA          Specimen UA          Tear Drop Cells          Marijuana (THC) Metabolite          Toxicology Information          Troponin I          Urobilinogen, UA          WBC, UA          WBC          Yeast, UA                      09/13/17  0841 09/13/17  0758  09/13/17  0631 09/13/17  0532 09/13/17  0433      Benzodiazepines          Methadone metabolites          Phencyclidine          Albumin          Alcohol, Medical, Serum          Alkaline Phosphatase          ALT          Amphetamine Screen, Ur          Anion Gap 15         Aniso          Appearance, UA          AST          Bacteria, UA          Barbiturate Screen, Ur          Baso #          Basophil%          Beta-Hydroxybutyrate          Bilirubin (UA)          Total Bilirubin          BNP          BUN, Bld 12         Calcium 9.5         Chloride 104         CO2 13(L)         Cocaine (Metab.)          Color, UA          Creatinine 1.6(H)         Creatinine, Random Ur          DelSys          Differential Method          eGFR if  >60         eGFR if non  56  Comment:  Calculation used to obtain the estimated glomerular filtration  rate (eGFR) is the CKD-EPI equation. Since race is unknown   in our information system, the eGFR values for   -American and Non--American patients are given   for each creatinine result.  (A)         Eos #          Eosinophil%          Glucose 251(H)         Glucose, UA          Gran #          Gran%          Hematocrit          Hemoglobin          Hyaline Casts, UA          Insulin Collection Interval          Ketones, UA          Leukocytes, UA          Lipase          Lymph #          Lymph%          Magnesium 2.0         MCH          MCHC          MCV          Microscopic Comment          Mono #          Mono%          MPV          Nitrite, UA          Occult Blood UA          Opiate Scrn, Ur          Osmolality          Ovalocytes          pH, UA          Phosphorus 1.8(L)         Platelets          POC BE          POC HCO3          POC PCO2          POC PH          POC PO2          POC SATURATED O2          POC TCO2          POCT Glucose  232(H) 183(H) 149(H) 132(H)     Poik          Potassium 4.6         Total Protein           Protein, UA          RBC          RBC, UA          RDW          Sample          Sodium 132(L)         Sodium Urine Random          Specific Gravity, UA          Specimen UA          Tear Drop Cells          Marijuana (THC) Metabolite          Toxicology Information          Troponin I          Urobilinogen, UA          WBC, UA          WBC          Yeast, UA                      09/13/17  0419 09/13/17  0337 09/13/17  0239 09/13/17  0158 09/13/17  0140      Benzodiazepines    Negative      Methadone metabolites    Negative      Phencyclidine    Negative      Albumin          Alcohol, Medical, Serum          Alkaline Phosphatase          ALT          Amphetamine Screen, Ur    Negative      Anion Gap 19(H)         Aniso          Appearance, UA    Clear      AST          Bacteria, UA    Occasional      Barbiturate Screen, Ur    Negative      Baso # 0.01         Basophil% 0.1         Beta-Hydroxybutyrate          Bilirubin (UA)    Negative      Total Bilirubin          BNP          BUN, Bld 13         Calcium 9.8         Chloride 106         CO2 10(L)         Cocaine (Metab.)    Negative      Color, UA    Straw      Creatinine 1.8(H)         Creatinine, Random Ur    32.7  Comment:  The random urine reference ranges provided were established   for 24 hour urine collections.  No reference ranges exist for  random urine specimens.  Correlate clinically.        DelSys          Differential Method Automated         eGFR if  56(A)         eGFR if non  48  Comment:  Calculation used to obtain the estimated glomerular filtration  rate (eGFR) is the CKD-EPI equation. Since race is unknown   in our information system, the eGFR values for   -American and Non--American patients are given   for each creatinine result.  (A)         Eos # 0.0         Eosinophil% 0.0         Glucose 154(H)         Glucose, UA    3+(A)      Gran # 12.7(H)         Gran% 74.6(H)         Hematocrit 47.5          Hemoglobin 16.4         Hyaline Casts, UA    0      Insulin Collection Interval          Ketones, UA    2+(A)      Leukocytes, UA    Negative      Lipase          Lymph # 3.0         Lymph% 17.4(L)         Magnesium 2.2         MCH 31.2(H)         MCHC 34.5         MCV 91         Microscopic Comment    SEE COMMENT  Comment:  Other formed elements not mentioned in the report are not   present in the microscopic examination.         Mono # 1.4(H)         Mono% 7.9         MPV 12.7         Nitrite, UA    Negative      Occult Blood UA    2+(A)      Opiate Scrn, Ur    Negative      Osmolality          Ovalocytes          pH, UA    5.0      Phosphorus 2.2(L)         Platelets 369(H)         POC BE          POC HCO3          POC PCO2          POC PH          POC PO2          POC SATURATED O2          POC TCO2          POCT Glucose  143(H) 208(H)  252(H)     Poik          Potassium 5.2(H)         Total Protein          Protein, UA    1+  Comment:  Recommend a 24 hour urine protein or a urine   protein/creatinine ratio if globulin induced proteinuria is  clinically suspected.  (A)      RBC 5.25         RBC, UA    1      RDW 13.5         Sample          Sodium 135(L)         Sodium Urine Random    95  Comment:  The random urine reference ranges provided were established   for 24 hour urine collections.  No reference ranges exist for  random urine specimens.  Correlate clinically.        Specific Gravity, UA    1.020      Specimen UA    Urine, Clean Catch      Tear Drop Cells          Marijuana (THC) Metabolite    Negative      Toxicology Information    SEE COMMENT  Comment:  This screen includes the following classes of drugs at the   listed cut-off:  Benzodiazepines                  200 ng/ml  Methadone                        300 ng/ml  Cocaine metabolite               300 ng/ml  Opiates                          300 ng/ml  Barbiturates                     200 ng/ml  Amphetamines                    1000 ng/ml  Marijuana  metabs (THC)            50 ng/ml  Phencyclidine (PCP)               25 ng/ml  High concentrations of Diphenhydramine may cross-react with  Phencyclidine PCP screening immunoassay giving a false   positive result.  High concentrations of Methylenedioxymethamphetamine (MDMA aka  Ectasy) and other structurally similar compounds may cross-   react with the Amphetamine/Methamphetamine screening   immunoassay giving a false positive result.  A metabolite of the anti-HIV drug Sustiva () may cause  false positive results in the Marijuana metabolite (THC)   screening assay.  Note: This exception list includes only more common   interferants in toxicology screen testing.  Because of many   cross-reactantspositive results on toxicology drug screens   should be confirmed whenever results do not correlate with   clinical presentation.  This report is intended for use in clinical monitoring and  management of patients. It is not intended for use in   employment related drug testing.  Because of any cross-reactants, positive results on toxicology  drug screens should be confirmed whenever results do not  correlate with clinical presentation.  Presumptive positive results are unconfirmed and may be used   only for medical purposes.        Troponin I          Urobilinogen, UA    Negative      WBC, UA    0      WBC 17.04(H)         Yeast, UA    None                  09/12/17  2339 09/12/17  2339 09/12/17  2330      Benzodiazepines        Methadone metabolites        Phencyclidine        Albumin  4.4      Alcohol, Medical, Serum  <10      Alkaline Phosphatase  119      ALT  22      Amphetamine Screen, Ur        Anion Gap  21(H)      Aniso  Slight      Appearance, UA        AST  15      Bacteria, UA        Barbiturate Screen, Ur        Baso #  0.01      Basophil%  0.1      Beta-Hydroxybutyrate  6.4(H)      Bilirubin (UA)        Total Bilirubin  0.6  Comment:  For infants and newborns, interpretation of results should be  based  on gestational age, weight and in agreement with clinical  observations.  Premature Infant recommended reference ranges:  Up to 24 hours.............<8.0 mg/dL  Up to 48 hours............<12.0 mg/dL  3-5 days..................<15.0 mg/dL  6-29 days.................<15.0 mg/dL        BNP  <10  Comment:  Values of less than 100 pg/ml are consistent with non-CHF populations.      BUN, Bld  14      Calcium  9.6      Chloride  99      CO2  7  Comment:  CO2 and Glucose result(s) called and verbal readback obtained from   [employer]. , 09/13/2017 00:27  (LL)      Cocaine (Metab.)        Color, UA        Creatinine  2.1(H)      Creatinine, Random Ur        DelSys Room Air       Differential Method  Automated      eGFR if   46(A)      eGFR if non   40  Comment:  Calculation used to obtain the estimated glomerular filtration  rate (eGFR) is the CKD-EPI equation. Since race is unknown   in our information system, the eGFR values for   -American and Non--American patients are given   for each creatinine result.  (A)      Eos #  0.0      Eosinophil%  0.0      Glucose  461  Comment:  CO2 and Glucose result(s) called and verbal readback obtained from   [employer]. , 09/13/2017 00:27  (HH)      Glucose, UA        Gran #  13.0(H)      Gran%  84.4(H)      Hematocrit  47.3      Hemoglobin  16.3      Hyaline Casts, UA        Insulin Collection Interval  Random      Ketones, UA        Leukocytes, UA        Lipase  19      Lymph #  1.7      Lymph%  11.1(L)      Magnesium  2.1      MCH  31.2(H)      MCHC  34.5      MCV  91      Microscopic Comment        Mono #  0.7      Mono%  4.7      MPV  12.8      Nitrite, UA        Occult Blood UA        Opiate Scrn, Ur        Osmolality  303(H)      Ovalocytes  Occasional      pH, UA        Phosphorus        Platelets  367(H)      POC BE -20       POC HCO3 8.1(L)       POC PCO2 26.5(L)       POC PH 7.093(L)       POC PO2 28(LL)       POC  SATURATED O2 34(L)       POC TCO2 9(L)       POCT Glucose   337(H)     Poik  Slight      Potassium  6.1(H)      Total Protein  9.7(H)      Protein, UA        RBC  5.22      RBC, UA        RDW  13.5      Sample VENOUS       Sodium  127(L)      Sodium Urine Random        Specific Gravity, UA        Specimen UA        Tear Drop Cells  Occasional      Marijuana (THC) Metabolite        Toxicology Information        Troponin I  <0.006  Comment:  The reference interval for Troponin I represents the 99th percentile   cutoff   for our facility and is consistent with 3rd generation assay   performance.        Urobilinogen, UA        WBC, UA        WBC  15.40(H)      Yeast, UA            All pertinent labs within the past 24 hours have been reviewed.    Significant Imaging: I have reviewed all pertinent imaging results/findings within the past 24 hours.

## 2017-09-13 NOTE — CARE UPDATE
Patient seen.  Agree with Dr. Rubalcava's treatment and plan.  Very odd patient.  Covered under sheets and won't let me examine him.  Negative tox screen.  Psych consulted.  AG has closed.  Hyperglycemia improved.  DKA has resolved, but remains acidotic.  Will turn off insulin drip and transition to SQ insulin.  Advance diet.  Continue IVF hydration.

## 2017-09-13 NOTE — PLAN OF CARE
09/13/17 1421   Discharge Assessment   Assessment Type Discharge Planning Assessment   Confirmed/corrected address and phone number on facesheet? No   Assessment information obtained from? Medical Record;Other   Expected Length of Stay (days) 2   Communicated expected length of stay with patient/caregiver no   Prior to hospitilization cognitive status: Alert/Oriented   Prior to hospitalization functional status: Independent   Current cognitive status: Unable to Assess   Current Functional Status: Needs Assistance   Facility Arrived From: home   Lives With significant other   Able to Return to Prior Arrangements unable to determine at this time (comments)   Is patient able to care for self after discharge? Yes   Readmission Within The Last 30 Days unable to assess   Patient currently being followed by outpatient case management? Unable to determine (comments)   Equipment Currently Used at Home none   Do you have any problems affording any of your prescribed medications? TBD   Is the patient taking medications as prescribed? no   If no, which medications is patient not taking? apparently forgot his BP medication   Does the patient have transportation home? (unable to assess/has Medicaid benefit)   Does the patient receive services at the [facility]? No   Discharge Plan A Home   Discharge Plan B Other  (to be determined)   Patient/Family In Agreement With Plan unable to assess   SW initiated discharge planning with patient. Reviewed chart, spoke with Dr Cano, spoke with  Jake at [facility]. Attempted meeting with patient who initially said hello but pulled covers over head and declined to speak. Patient did speak with Dr Cano who informs patient reports lives with girlfriend, has hx of schizophrenia treated at [facility], has been in tx for heroin abuse thru [facility] aprox 2 mo off drug. Dr Cano informs that patient has hx of  disability, with 10 th grade education.   DERREK spoke with scheduling at [facility]. Patient goes to [facility], seen recently. DERREK scheduled hospital follow up for Monday, Sept 18 at 10 am.   SW will attempt follow up with patient at later time.   ,

## 2017-09-13 NOTE — CONSULTS
"[facility]  Psychiatry  Consult Note    Patient Name: ROSSI Miller  MRN: [id number]   Code Status: No Order  Admission Date: 9/12/2017  Hospital Length of Stay: 0 days  Attending Physician: Aaron Barnes MD  Primary Care Provider: Juan Of [facility]    Current Legal Status: N/A    Patient information was obtained from patient and ER records.   Inpatient consult to Psychiatry  Consult performed by: ROBERT LIU  Consult ordered by: DANIELA LABOY        Subjective:     Principal Problem:Diabetic ketoacidosis without coma    Chief Complaint:  Altered mental status     HPI: Patient ROSSI Miller presented to the ED 3 times in a matter of a couple days and left AMA twice before letting being admitted for his uncontrolled blood sugar.  He has been quiet and not participative with questioning since being in the hospital.  He does allow me to engage somewhat and provided some basic information.  He states that he wasn't feeling good but is "worried" about his health.  States that he now has "sugar problems".  Has a history of high blood pressure and schizophrenia.  Says that he takes a pill for each.  Was arrested for "pulling a gun" at police and says that he spent 4 months in "[place]" which is the mental health inpatient unit for some of the prisons.  He says that he was diagnosed with schizophrenia at that time and has been following up with [facility].  He used to take Seroquel but now takes Geodon but doesn't know the dose.  Also says that he takes a medication twice a day for his mood, which I am assuming is Depakote or some other mood stabilizer (he doesn't recognize the names).  Also says that he goes to [facility] for rehab to get off of heroin and has been clean for 2 months.  Denies any current depression, anxiety, ronna, or psychosis.  States that he has had symptoms of increased energy and hearing voices when he does drugs.  Also says that he has been " "better since being off heroin.  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  He is not very involved in conversation but does give some minimal information.  Also says that he gets a monthly check and has gotten one his entire life.  He went to 10th grade of special education classes.    Hospital Course: No notes on file         Patient History           Medical as of 9/13/2017     Past Medical History     Diagnosis Date Comments Source    Asthma -- pt states he "grew out of it" and no longer has asthma. Provider    History of psychiatric hospitalization -- -- Provider    Hx of psychiatric care -- -- Provider    Hypertension -- -- Provider    Psychiatric problem -- -- Provider    Therapy -- -- Provider          Pertinent Negatives     Diagnosis Date Noted Comments Source    Suicide attempt 9/13/2017 -- Provider                  Surgical as of 9/13/2017     Past Surgical History     Procedure Laterality Date Comments Source    FRACTURE SURGERY -- -- ankle Provider                  Family as of 9/13/2017     Problem Relation Name Age of Onset Comments Source    Diabetes Paternal Uncle -- -- -- Provider            Tobacco Use as of 9/13/2017     Smoking Status Smoking Start Date Smoking Quit Date Packs/day Years Used    Current Every Day Smoker -- -- -- --    Types Comments Smokeless Tobacco Status Smokeless Tobacco Quit Date Source     Cigarettes -- Never Used -- Provider            Alcohol Use as of 9/13/2017     Alcohol Use Drinks/Week Alcohol/Week Comments Source    No -- -- -- Provider            Drug Use as of 9/13/2017     Drug Use Types Frequency Comments Source    Yes  Heroin -- patient states stopped using 2 months ago, currently at [place] Provider            Sexual Activity as of 9/13/2017     Sexually Active Birth Control Partners Comments Source    -- -- -- -- Provider            Activities of Daily Living as of 9/13/2017     Activities of Daily Living Question Response " Comments Source    Patient feels they ought to cut down on drinking/drug use Not Asked -- Provider    Patient annoyed by others criticizing their drinking/drug use Not Asked -- Provider    Patient has felt bad or guilty about drinking/drug use Not Asked -- Provider    Patient has had a drink/used drugs as an eye opener in the AM Not Asked -- Provider            Social Documentation as of 9/13/2017    **None**           Occupational as of 9/13/2017    **None**           Socioeconomic as of 9/13/2017     Marital Status Spouse Name Number of Children Years Education Preferred Language Ethnicity Race Source    Single -- -- -- English /Black Black or  --         Pertinent History Q A Comments    as of 9/13/2017 Lives with  girlfriend    Place in Birth Order      Lives in home     Number of Siblings      Raised by      Legal Involvement      Childhood Trauma      Criminal History of arrest and incarceration previous psychiatric [facility] for 4 months    Financial Status disabled     Highest Level of Education unfinished highschool special education classes    Does patient have access to a firearm? No      Service No     Primary Leisure Activity      Spirituality non-practicing        Review of patient's allergies indicates:  No Known Allergies    No current facility-administered medications on file prior to encounter.      Current Outpatient Prescriptions on File Prior to Encounter   Medication Sig    cloNIDine (CATAPRES) 0.2 MG tablet Take 0.2 mg by mouth 2 (two) times daily.    metformin (FORTAMET) 500 mg 24 hr tablet Take 1 tablet (500 mg total) by mouth daily with breakfast.     Psychotherapeutics     Start     Stop Route Frequency Ordered    09/13/17 0401  ramelteon tablet 8 mg      -- Oral Nightly PRN 09/13/17 0302        Review of Systems   Constitutional: Negative for activity change and appetite change.   Respiratory: Negative for shortness of breath.    Cardiovascular:  "Negative for chest pain.   Gastrointestinal: Positive for nausea.   Musculoskeletal: Negative for myalgias.   Psychiatric/Behavioral: Negative for dysphoric mood, hallucinations, sleep disturbance and suicidal ideas.     Objective:     Vital Signs (Most Recent):  Temp: 98.3 °F (36.8 °C) (09/13/17 1115)  Pulse: 90 (09/13/17 1400)  Resp: 18 (09/13/17 1400)  BP: (!) 165/86 (09/13/17 1400)  SpO2: 100 % (09/13/17 1400) Vital Signs (24h Range):  Temp:  [98 °F (36.7 °C)-98.8 °F (37.1 °C)] 98.3 °F (36.8 °C)  Pulse:  [] 90  Resp:  [16-34] 18  SpO2:  [99 %-100 %] 100 %  BP: (141-202)/() 165/86     Height: 5' 11" (180.3 cm)  Weight: 126.3 kg (278 lb 7.1 oz)  Body mass index is 38.83 kg/m².      Intake/Output Summary (Last 24 hours) at 09/13/17 1530  Last data filed at 09/13/17 1400   Gross per 24 hour   Intake          2665.19 ml   Output              850 ml   Net          1815.19 ml       Physical Exam   Psychiatric:   EXAMINATION    CONSTITUTIONAL  General Appearance: hospital attire    MUSCULOSKELETAL  Muscle Strength and Tone: normal  Abnormal Involuntary Movements: none noted or endorsed  Gait and Station: not observed    PSYCHIATRIC MENTAL STATUS EXAM   Level of Consciousness: awake and alert  Orientation: name, place, month, year, situation  Grooming: limited  Psychomotor Behavior: somewhat slowed  Speech: soft volume and tone; delayed rate  Language: no abnormalities  Mood: "OK"  Affect: blunted; almost restricted  Thought Process: concrete  Associations: intact but simple  Thought Content: denies suicidal/homicidal/psychosis  Memory: intact to recent and remote  Attention: diminished  Fund of Knowledge: intact to conversation  Insight: poor into mental illness and medications/compliance  Judgment: poor into medications and compliance            Significant Labs:   Last 24 Hours:   Recent Lab Results       09/13/17  1513 09/13/17  1225 09/13/17  1222 09/13/17  1008 09/13/17  0848      Benzodiazepines       "    Methadone metabolites          Phencyclidine          Albumin          Alcohol, Medical, Serum          Alkaline Phosphatase          ALT          Amphetamine Screen, Ur          Anion Gap  13        Aniso          Appearance, UA          AST          Bacteria, UA          Barbiturate Screen, Ur          Baso #          Basophil%          Beta-Hydroxybutyrate          Bilirubin (UA)          Total Bilirubin          BNP          BUN, Bld  10        Calcium  9.6        Chloride  105        CO2  12(L)        Cocaine (Metab.)          Color, UA          Creatinine  1.3        Creatinine, Random Ur          DelSys          Differential Method          eGFR if   >60        eGFR if non   >60  Comment:  Calculation used to obtain the estimated glomerular filtration  rate (eGFR) is the CKD-EPI equation. Since race is unknown   in our information system, the eGFR values for   -American and Non--American patients are given   for each creatinine result.          Eos #          Eosinophil%          Glucose  225(H)        Glucose, UA          Gran #          Gran%          Hematocrit          Hemoglobin          Hyaline Casts, UA          Insulin Collection Interval          Ketones, UA          Leukocytes, UA          Lipase          Lymph #          Lymph%          Magnesium  2.2        MCH          MCHC          MCV          Microscopic Comment          Mono #          Mono%          MPV          Nitrite, UA          Occult Blood UA          Opiate Scrn, Ur          Osmolality          Ovalocytes          pH, UA          Phosphorus  1.4(L)        Platelets          POC BE          POC HCO3          POC PCO2          POC PH          POC PO2          POC SATURATED O2          POC TCO2          POCT Glucose 221(H)  210(H) 263(H) 212(H)     Poik          Potassium  4.5  Comment:  Specimen slightly hemolyzed        Total Protein          Protein, UA          RBC          RBC, UA           RDW          Sample          Sodium  130(L)        Sodium Urine Random          Specific Gravity, UA          Specimen UA          Tear Drop Cells          Marijuana (THC) Metabolite          Toxicology Information          Troponin I          Urobilinogen, UA          WBC, UA          WBC          Yeast, UA                      09/13/17  0841 09/13/17  0744 09/13/17  0631 09/13/17  0532 09/13/17  0433      Benzodiazepines          Methadone metabolites          Phencyclidine          Albumin          Alcohol, Medical, Serum          Alkaline Phosphatase          ALT          Amphetamine Screen, Ur          Anion Gap 15         Aniso          Appearance, UA          AST          Bacteria, UA          Barbiturate Screen, Ur          Baso #          Basophil%          Beta-Hydroxybutyrate          Bilirubin (UA)          Total Bilirubin          BNP          BUN, Bld 12         Calcium 9.5         Chloride 104         CO2 13(L)         Cocaine (Metab.)          Color, UA          Creatinine 1.6(H)         Creatinine, Random Ur          DelSys          Differential Method          eGFR if  >60         eGFR if non  56  Comment:  Calculation used to obtain the estimated glomerular filtration  rate (eGFR) is the CKD-EPI equation. Since race is unknown   in our information system, the eGFR values for   -American and Non--American patients are given   for each creatinine result.  (A)         Eos #          Eosinophil%          Glucose 251(H)         Glucose, UA          Gran #          Gran%          Hematocrit          Hemoglobin          Hyaline Casts, UA          Insulin Collection Interval          Ketones, UA          Leukocytes, UA          Lipase          Lymph #          Lymph%          Magnesium 2.0         MCH          MCHC          MCV          Microscopic Comment          Mono #          Mono%          MPV          Nitrite, UA          Occult Blood UA           Opiate Scrn, Ur          Osmolality          Ovalocytes          pH, UA          Phosphorus 1.8(L)         Platelets          POC BE          POC HCO3          POC PCO2          POC PH          POC PO2          POC SATURATED O2          POC TCO2          POCT Glucose  232(H) 183(H) 149(H) 132(H)     Poik          Potassium 4.6         Total Protein          Protein, UA          RBC          RBC, UA          RDW          Sample          Sodium 132(L)         Sodium Urine Random          Specific Gravity, UA          Specimen UA          Tear Drop Cells          Marijuana (THC) Metabolite          Toxicology Information          Troponin I          Urobilinogen, UA          WBC, UA          WBC          Yeast, UA                      09/13/17  0419 09/13/17  0337 09/13/17  0239 09/13/17  0158 09/13/17  0140      Benzodiazepines    Negative      Methadone metabolites    Negative      Phencyclidine    Negative      Albumin          Alcohol, Medical, Serum          Alkaline Phosphatase          ALT          Amphetamine Screen, Ur    Negative      Anion Gap 19(H)         Aniso          Appearance, UA    Clear      AST          Bacteria, UA    Occasional      Barbiturate Screen, Ur    Negative      Baso # 0.01         Basophil% 0.1         Beta-Hydroxybutyrate          Bilirubin (UA)    Negative      Total Bilirubin          BNP          BUN, Bld 13         Calcium 9.8         Chloride 106         CO2 10(L)         Cocaine (Metab.)    Negative      Color, UA    Straw      Creatinine 1.8(H)         Creatinine, Random Ur    32.7  Comment:  The random urine reference ranges provided were established   for 24 hour urine collections.  No reference ranges exist for  random urine specimens.  Correlate clinically.        Suha          Differential Method Automated         eGFR if  56(A)         eGFR if non  48  Comment:  Calculation used to obtain the estimated glomerular filtration  rate (eGFR)  is the CKD-EPI equation. Since race is unknown   in our information system, the eGFR values for   -American and Non--American patients are given   for each creatinine result.  (A)         Eos # 0.0         Eosinophil% 0.0         Glucose 154(H)         Glucose, UA    3+(A)      Gran # 12.7(H)         Gran% 74.6(H)         Hematocrit 47.5         Hemoglobin 16.4         Hyaline Casts, UA    0      Insulin Collection Interval          Ketones, UA    2+(A)      Leukocytes, UA    Negative      Lipase          Lymph # 3.0         Lymph% 17.4(L)         Magnesium 2.2         MCH 31.2(H)         MCHC 34.5         MCV 91         Microscopic Comment    SEE COMMENT  Comment:  Other formed elements not mentioned in the report are not   present in the microscopic examination.         Mono # 1.4(H)         Mono% 7.9         MPV 12.7         Nitrite, UA    Negative      Occult Blood UA    2+(A)      Opiate Scrn, Ur    Negative      Osmolality          Ovalocytes          pH, UA    5.0      Phosphorus 2.2(L)         Platelets 369(H)         POC BE          POC HCO3          POC PCO2          POC PH          POC PO2          POC SATURATED O2          POC TCO2          POCT Glucose  143(H) 208(H)  252(H)     Poik          Potassium 5.2(H)         Total Protein          Protein, UA    1+  Comment:  Recommend a 24 hour urine protein or a urine   protein/creatinine ratio if globulin induced proteinuria is  clinically suspected.  (A)      RBC 5.25         RBC, UA    1      RDW 13.5         Sample          Sodium 135(L)         Sodium Urine Random    95  Comment:  The random urine reference ranges provided were established   for 24 hour urine collections.  No reference ranges exist for  random urine specimens.  Correlate clinically.        Specific Gravity, UA    1.020      Specimen UA    Urine, Clean Catch      Tear Drop Cells          Marijuana (THC) Metabolite    Negative      Toxicology Information    SEE  COMMENT  Comment:  This screen includes the following classes of drugs at the   listed cut-off:  Benzodiazepines                  200 ng/ml  Methadone                        300 ng/ml  Cocaine metabolite               300 ng/ml  Opiates                          300 ng/ml  Barbiturates                     200 ng/ml  Amphetamines                    1000 ng/ml  Marijuana metabs (THC)            50 ng/ml  Phencyclidine (PCP)               25 ng/ml  High concentrations of Diphenhydramine may cross-react with  Phencyclidine PCP screening immunoassay giving a false   positive result.  High concentrations of Methylenedioxymethamphetamine (MDMA aka  Ectasy) and other structurally similar compounds may cross-   react with the Amphetamine/Methamphetamine screening   immunoassay giving a false positive result.  A metabolite of the anti-HIV drug Sustiva () may cause  false positive results in the Marijuana metabolite (THC)   screening assay.  Note: This exception list includes only more common   interferants in toxicology screen testing.  Because of many   cross-reactantspositive results on toxicology drug screens   should be confirmed whenever results do not correlate with   clinical presentation.  This report is intended for use in clinical monitoring and  management of patients. It is not intended for use in   employment related drug testing.  Because of any cross-reactants, positive results on toxicology  drug screens should be confirmed whenever results do not  correlate with clinical presentation.  Presumptive positive results are unconfirmed and may be used   only for medical purposes.        Troponin I          Urobilinogen, UA    Negative      WBC, UA    0      WBC 17.04(H)         Yeast, UA    None                  09/12/17  2339 09/12/17  2339 09/12/17  2330      Benzodiazepines        Methadone metabolites        Phencyclidine        Albumin  4.4      Alcohol, Medical, Serum  <10      Alkaline Phosphatase  119       ALT  22      Amphetamine Screen, Ur        Anion Gap  21(H)      Aniso  Slight      Appearance, UA        AST  15      Bacteria, UA        Barbiturate Screen, Ur        Baso #  0.01      Basophil%  0.1      Beta-Hydroxybutyrate  6.4(H)      Bilirubin (UA)        Total Bilirubin  0.6  Comment:  For infants and newborns, interpretation of results should be based  on gestational age, weight and in agreement with clinical  observations.  Premature Infant recommended reference ranges:  Up to 24 hours.............<8.0 mg/dL  Up to 48 hours............<12.0 mg/dL  3-5 days..................<15.0 mg/dL  6-29 days.................<15.0 mg/dL        BNP  <10  Comment:  Values of less than 100 pg/ml are consistent with non-CHF populations.      BUN, Bld  14      Calcium  9.6      Chloride  99      CO2  7  Comment:  CO2 and Glucose result(s) called and verbal readback obtained from   Network. , 09/13/2017 00:27  (LL)      Cocaine (Metab.)        Color, UA        Creatinine  2.1(H)      Creatinine, Random Ur        DelSys Room Air       Differential Method  Automated      eGFR if   46(A)      eGFR if non   40  Comment:  Calculation used to obtain the estimated glomerular filtration  rate (eGFR) is the CKD-EPI equation. Since race is unknown   in our information system, the eGFR values for   -American and Non--American patients are given   for each creatinine result.  (A)      Eos #  0.0      Eosinophil%  0.0      Glucose  461  Comment:  CO2 and Glucose result(s) called and verbal readback obtained from   Network. , 09/13/2017 00:27  (HH)      Glucose, UA        Gran #  13.0(H)      Gran%  84.4(H)      Hematocrit  47.3      Hemoglobin  16.3      Hyaline Casts, UA        Insulin Collection Interval  Random      Ketones, UA        Leukocytes, UA        Lipase  19      Lymph #  1.7      Lymph%  11.1(L)      Magnesium  2.1      MCH  31.2(H)      MCHC  34.5      MCV  91    "   Microscopic Comment        Mono #  0.7      Mono%  4.7      MPV  12.8      Nitrite, UA        Occult Blood UA        Opiate Scrn, Ur        Osmolality  303(H)      Ovalocytes  Occasional      pH, UA        Phosphorus        Platelets  367(H)      POC BE -20       POC HCO3 8.1(L)       POC PCO2 26.5(L)       POC PH 7.093(L)       POC PO2 28(LL)       POC SATURATED O2 34(L)       POC TCO2 9(L)       POCT Glucose   337(H)     Poik  Slight      Potassium  6.1(H)      Total Protein  9.7(H)      Protein, UA        RBC  5.22      RBC, UA        RDW  13.5      Sample VENOUS       Sodium  127(L)      Sodium Urine Random        Specific Gravity, UA        Specimen UA        Tear Drop Cells  Occasional      Marijuana (THC) Metabolite        Toxicology Information        Troponin I  <0.006  Comment:  The reference interval for Troponin I represents the 99th percentile   cutoff   for our facility and is consistent with 3rd generation assay   performance.        Urobilinogen, UA        WBC, UA        WBC  15.40(H)      Yeast, UA            All pertinent labs within the past 24 hours have been reviewed.    Significant Imaging: I have reviewed all pertinent imaging results/findings within the past 24 hours.    Assessment/Plan:     Borderline intellectual functioning    Patient has a long history of "special education" classes and slowed learning.  He will need extra counseling and education about his medications and illnesses.        Heroin use disorder, severe, in early remission    States that he is currently in [facility] and will be able to return there after discharge.  Long history of heroin use but reportedly clean for almost a couple months.  Encourage sobriety and outpatient follow up.        Undifferentiated schizophrenia    Has history of schizophrenia but because he is a limited historian, unable to get clear picture of which type and degree of severity.  Being that he was in a alf psychiatric unit, he " likely has a criminal link to his mental illness.  Restart Geodon at 40mg nightly and Depakote at 500mg TID for now.  He currently does not meet criteria for inpatient psychiatric admission.  Upon discharge, he will be able to return to [facility] for outpatient follow up.               Hunter Cano MD   Psychiatry  [facility]

## 2017-09-13 NOTE — ASSESSMENT & PLAN NOTE
He has been counseled and encouraged to adhere to his medication regimen but exhibits poor insight into his medical condition.

## 2017-09-13 NOTE — SUBJECTIVE & OBJECTIVE
"Past Medical History:   Diagnosis Date    Asthma     pt states he "grew out of it" and no longer has asthma.    Bipolar disorder     Depression     Hypertension        Past Surgical History:   Procedure Laterality Date    FRACTURE SURGERY      ankle       Review of patient's allergies indicates:  No Known Allergies    Current Facility-Administered Medications on File Prior to Encounter   Medication    [COMPLETED] cloNIDine tablet 0.1 mg    [COMPLETED] diphenhydrAMINE capsule 50 mg    [COMPLETED] ketorolac injection 30 mg    [COMPLETED] ondansetron injection 4 mg    [COMPLETED] sodium chloride 0.9% bolus 1,000 mL     Current Outpatient Prescriptions on File Prior to Encounter   Medication Sig    cloNIDine (CATAPRES) 0.2 MG tablet Take 0.2 mg by mouth 2 (two) times daily.    metformin (FORTAMET) 500 mg 24 hr tablet Take 1 tablet (500 mg total) by mouth daily with breakfast.     Family History     Problem Relation (Age of Onset)    Diabetes Paternal Uncle        Social History Main Topics    Smoking status: Current Every Day Smoker     Types: Cigarettes    Smokeless tobacco: Never Used    Alcohol use No    Drug use:      Types: Heroin      Comment: patient states stopped using 2 months ago, currently at [place]    Sexual activity: Not on file     Review of Systems   Unable to perform ROS: Psychiatric disorder     Objective:     Vital Signs (Most Recent):  Temp: 98 °F (36.7 °C) (09/13/17 0310)  Pulse: 105 (09/13/17 0310)  Resp: (!) 21 (09/13/17 0310)  BP: (!) 162/85 (09/13/17 0300)  SpO2: 100 % (09/13/17 0310) Vital Signs (24h Range):  Temp:  [97.9 °F (36.6 °C)-98.8 °F (37.1 °C)] 98 °F (36.7 °C)  Pulse:  [] 105  Resp:  [16-25] 21  SpO2:  [100 %] 100 %  BP: (144-217)/() 162/85     Weight: 126.3 kg (278 lb 7.1 oz)  Body mass index is 38.83 kg/m².    Physical Exam   Constitutional: He appears well-developed and well-nourished. No distress.   obese   HENT:   Head: Normocephalic and atraumatic. "   Right Ear: External ear normal.   Left Ear: External ear normal.   Nose: Nose normal.   Eyes: Right eye exhibits no discharge. Left eye exhibits no discharge.   Neck: Normal range of motion.   Cardiovascular:   Tachycardic without murmurs or gallops   Pulmonary/Chest: Effort normal and breath sounds normal. No respiratory distress. He has no wheezes. He has no rales. He exhibits no tenderness.   Abdominal: Soft. Bowel sounds are normal. He exhibits no distension. There is no tenderness. There is no rebound and no guarding.   Musculoskeletal: Normal range of motion. He exhibits no edema.   Neurological: He is alert.   Skin: Skin is warm and dry. He is not diaphoretic. No erythema.   Psychiatric: His affect is blunt. He is slowed and withdrawn. Thought content is delusional.   Nursing note and vitals reviewed.       Significant Labs: All pertinent labs within the past 24 hours have been reviewed.    Significant Imaging: I have reviewed and interpreted all pertinent imaging results/findings within the past 24 hours.

## 2017-09-13 NOTE — ASSESSMENT & PLAN NOTE
This is Mr. Miller's third visit to the ED and his renal function has been worsening with each visit; I suspect that this is due to a pre-renal etiology such as osmotic diuresis from DKA.  Patient's urinalysis is significant for a specific gravity of 1.020, 1+ protein, 3+ glucose, 2+ ketones, and 2+ occult blood.  Urine output has been poor.  Will obtain additional urine studies; provide aggressive IV fluid hydration; monitor the urine output; recheck the renal function in the morning; and avoid nephrotoxins.

## 2017-09-13 NOTE — H&P
"[facility] Hospital Medicine  History & Physical    Patient Name: ROSSI Miller  MRN: [id number]  Admission Date: 9/12/2017  Attending Physician: Aaron Barnes MD   Primary Care Provider: [facility]         Patient information was obtained from patient and ER records.     Subjective:     Principal Problem:Diabetic ketoacidosis without coma    Chief Complaint: Nausea and vomiting for two days.    HPI: Mr. ROSSI Miller is a 32 y.o. male with essential hypertension, new-onset diabetes mellitus (HbA1c 11.2% Sept 2017), Bipolar 1 disorder, obesity (BMI 38.8), noncompliance with medication regimen, tobacco abuse, and history of heroin abuse who presents to [facility] ED with complaints of nausea & vomiting for two days.  He is minimally participatory in his interview so the information is obtained via chart review.  He was evaluated here twice in the last day or so with complaints of hyperglycemia per his PCP for which he was instructed to present to the ED.  He had been complaining of polydipsia and polyuria for four days.  He also had a headache.  He had some nausea with vomiting.  He left twice before against medical advice.  Further history is limited at this time.    Chart Review:  Patient has not had any recent hospitalizations or outpatient clinic visits within the system.    Past Medical History:   Diagnosis Date    Asthma     pt states he "grew out of it" and no longer has asthma.    Bipolar disorder     Depression     Hypertension        Past Surgical History:   Procedure Laterality Date    FRACTURE SURGERY      ankle       Review of patient's allergies indicates:  No Known Allergies    Current Facility-Administered Medications on File Prior to Encounter   Medication    [COMPLETED] cloNIDine tablet 0.1 mg    [COMPLETED] diphenhydrAMINE capsule 50 mg    [COMPLETED] ketorolac injection 30 mg    [COMPLETED] ondansetron injection 4 mg    [COMPLETED] sodium chloride 0.9% bolus 1,000 mL "     Current Outpatient Prescriptions on File Prior to Encounter   Medication Sig    cloNIDine (CATAPRES) 0.2 MG tablet Take 0.2 mg by mouth 2 (two) times daily.    metformin (FORTAMET) 500 mg 24 hr tablet Take 1 tablet (500 mg total) by mouth daily with breakfast.     Family History     Problem Relation (Age of Onset)    Diabetes Paternal Uncle        Social History Main Topics    Smoking status: Current Every Day Smoker     Types: Cigarettes    Smokeless tobacco: Never Used    Alcohol use No    Drug use:      Types: Heroin      Comment: patient states stopped using 2 months ago, currently at [place]    Sexual activity: Not on file     Review of Systems   Unable to perform ROS: Psychiatric disorder     Objective:     Vital Signs (Most Recent):  Temp: 98 °F (36.7 °C) (09/13/17 0310)  Pulse: 105 (09/13/17 0310)  Resp: (!) 21 (09/13/17 0310)  BP: (!) 162/85 (09/13/17 0300)  SpO2: 100 % (09/13/17 0310) Vital Signs (24h Range):  Temp:  [97.9 °F (36.6 °C)-98.8 °F (37.1 °C)] 98 °F (36.7 °C)  Pulse:  [] 105  Resp:  [16-25] 21  SpO2:  [100 %] 100 %  BP: (144-217)/() 162/85     Weight: 126.3 kg (278 lb 7.1 oz)  Body mass index is 38.83 kg/m².    Physical Exam   Constitutional: He appears well-developed and well-nourished. No distress.   obese   HENT:   Head: Normocephalic and atraumatic.   Right Ear: External ear normal.   Left Ear: External ear normal.   Nose: Nose normal.   Eyes: Right eye exhibits no discharge. Left eye exhibits no discharge.   Neck: Normal range of motion.   Cardiovascular:   Tachycardic without murmurs or gallops   Pulmonary/Chest: Effort normal and breath sounds normal. No respiratory distress. He has no wheezes. He has no rales. He exhibits no tenderness.   Abdominal: Soft. Bowel sounds are normal. He exhibits no distension. There is no tenderness. There is no rebound and no guarding.   Musculoskeletal: Normal range of motion. He exhibits no edema.   Neurological: He is alert.    Skin: Skin is warm and dry. He is not diaphoretic. No erythema.   Psychiatric: His affect is blunt. He is slowed and withdrawn. Thought content is delusional.   Nursing note and vitals reviewed.       Significant Labs: All pertinent labs within the past 24 hours have been reviewed.    Significant Imaging: I have reviewed and interpreted all pertinent imaging results/findings within the past 24 hours.    Assessment/Plan:     * Diabetic ketoacidosis without coma    Patient is with evidence of DKA given hyperglycemia, high anion gap metabolic acidosis, glucosuria, ketonuria, and elevated beta-hydroxybutyric acid.  Will first administer a regular insulin bolus and start weight-based insulin infusion until the anion-gap returns to normal and blood glucose is stabilized.  Will check hourly AccuCheks and check BMPs every 4 hours.  Will replace potassium as necessary.  Will start basal-bolus insulin therapy thereafter as well as an oral diet.  Etiology appears to be new-onset diabetes.        Acute renal failure    This is Mr. Miller's third visit to the ED and his renal function has been worsening with each visit; I suspect that this is due to a pre-renal etiology such as osmotic diuresis from DKA.  Patient's urinalysis is significant for a specific gravity of 1.020, 1+ protein, 3+ glucose, 2+ ketones, and 2+ occult blood.  Urine output has been poor.  Will obtain additional urine studies; provide aggressive IV fluid hydration; monitor the urine output; recheck the renal function in the morning; and avoid nephrotoxins.        Hyperkalemia    His hyperkalemia is likely due to both the transcellular potassium shifts often seen in DKA and acute renal failure.  He is already on an insulin infusion which will likely resolve his hyperkalemia.  Will check BNPs every 4 hours to assure resolution.        Diabetes mellitus, new onset    He was recently diagnosed with diabetes in the last couple days, so it is unclear what type of  diabetes he is afflicted with.  Will obtain a CARMELINA-65 Ab, insulin, and C-peptide level to help elucidate his type of diabetes in order to guide outpatient therapy.  Will also consult Diabetes Education.        Essential hypertension    Patient's blood pressure is poorly-controlled.  He is on clonidine but I doubt he is compliant with this.  Will provide as-needed clonidine.        Bipolar 1 disorder    He exhibits behaviors of paranoia and delusions and I do not feel he has the mental capacity to leave against medical advice as he did twice before.  He is currently not on medications.  Will consult Psychiatry.        Noncompliance with medication regimen    He has been counseled and encouraged to adhere to his medication regimen but exhibits poor insight into his medical condition.          Tobacco abuse    Patient was counseled on smoking cessation and he will be provided a nicotine transdermal patch applied while inpatient.  Will provide additional smoking cessation counseling prior to discharge.        History of heroin abuse    Stable; there are no acute issues.          VTE Risk Mitigation         Ordered     heparin (porcine) injection 5,000 Units  Every 12 hours     Route:  Subcutaneous        09/13/17 0302     Medium Risk of VTE  Once      09/13/17 0301            Critical care time spent on the evaluation and treatment of severe organ dysfunction, review of pertinent labs and imaging studies, discussions with consulting providers and discussions with patient/family: 60 minutes.         Bridget Rubalcava M.D.  Staff [facility] Hospitalist  Department of Hospital Medicine  [facility]  Pager: [phone]

## 2017-09-13 NOTE — HPI
Mr. ROSSI Miller is a 32 y.o. male with essential hypertension, new-onset diabetes mellitus (HbA1c 11.2% Sept 2017), Bipolar 1 disorder, obesity (BMI 38.8), noncompliance with medication regimen, tobacco abuse, and history of heroin abuse who presents to [facility] ED with complaints of nausea & vomiting for two days.  He is minimally participatory in his interview so the information is obtained via chart review.  He was evaluated here twice in the last day or so with complaints of hyperglycemia per his PCP for which he was instructed to present to the ED.  He had been complaining of polydipsia and polyuria for four days.  He also had a headache.  He had some nausea with vomiting.  He left twice before against medical advice.  Further history is limited at this time.

## 2017-09-13 NOTE — ASSESSMENT & PLAN NOTE
He exhibits behaviors of paranoia and delusions and I do not feel he has the mental capacity to leave against medical advice as he did twice before.  He is currently not on medications.  Will consult Psychiatry.

## 2017-09-13 NOTE — ASSESSMENT & PLAN NOTE
"Patient has a long history of "special education" classes and slowed learning.  He will need extra counseling and education about his medications and illnesses.  "

## 2017-09-13 NOTE — ASSESSMENT & PLAN NOTE
Patient's blood pressure is poorly-controlled.  He is on clonidine but I doubt he is compliant with this.  Will provide as-needed clonidine.

## 2017-09-13 NOTE — HPI
"Patient ROSSI Miller presented to the ED 3 times in a matter of a couple days and left AMA twice before letting being admitted for his uncontrolled blood sugar.  He has been quiet and not participative with questioning since being in the hospital.  He does allow me to engage somewhat and provided some basic information.  He states that he wasn't feeling good but is "worried" about his health.  States that he now has "sugar problems".  Has a history of high blood pressure and schizophrenia.  Says that he takes a pill for each.  Was arrested for "pulling a gun" at police and says that he spent 4 months in "[place]" which is the mental health inpatient unit for some of the prisons.  He says that he was diagnosed with schizophrenia at that time and has been following up with [facility].  He used to take Seroquel but now takes Geodon but doesn't know the dose.  Also says that he takes a medication twice a day for his mood, which I am assuming is Depakote or some other mood stabilizer (he doesn't recognize the names).  Also says that he goes to [facility] for rehab to get off of heroin and has been clean for 2 months.  Denies any current depression, anxiety, ronna, or psychosis.  States that he has had symptoms of increased energy and hearing voices when he does drugs.  Also says that he has been better since being off heroin.  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  He is not very involved in conversation but does give some minimal information.  Also says that he gets a monthly check and has gotten one his entire life.  He went to 10th grade of special education classes.  "

## 2017-09-13 NOTE — ASSESSMENT & PLAN NOTE
Patient is with evidence of DKA given hyperglycemia, high anion gap metabolic acidosis, glucosuria, ketonuria, and elevated beta-hydroxybutyric acid.  Will first administer a regular insulin bolus and start weight-based insulin infusion until the anion-gap returns to normal and blood glucose is stabilized.  Will check hourly AccuCheks and check BMPs every 4 hours.  Will replace potassium as necessary.  Will start basal-bolus insulin therapy thereafter as well as an oral diet.  Etiology appears to be new-onset diabetes.

## 2017-09-13 NOTE — ASSESSMENT & PLAN NOTE
His hyperkalemia is likely due to both the transcellular potassium shifts often seen in DKA and acute renal failure.  He is already on an insulin infusion which will likely resolve his hyperkalemia.  Will check BNPs every 4 hours to assure resolution.

## 2017-09-13 NOTE — ASSESSMENT & PLAN NOTE
He was recently diagnosed with diabetes in the last couple days, so it is unclear what type of diabetes he is afflicted with.  Will obtain a CARMELINA-65 Ab, insulin, and C-peptide level to help elucidate his type of diabetes in order to guide outpatient therapy.  Will also consult Diabetes Education.

## 2017-09-13 NOTE — ASSESSMENT & PLAN NOTE
States that he is currently in [facility] and will be able to return there after discharge.  Long history of heroin use but reportedly clean for almost a couple months.  Encourage sobriety and outpatient follow up.

## 2017-09-14 LAB
ANION GAP SERPL CALC-SCNC: 14 MMOL/L
BUN SERPL-MCNC: 11 MG/DL
CALCIUM SERPL-MCNC: 9 MG/DL
CHLORIDE SERPL-SCNC: 106 MMOL/L
CO2 SERPL-SCNC: 12 MMOL/L
CREAT SERPL-MCNC: 1.3 MG/DL
EST. GFR  (AFRICAN AMERICAN): >60 ML/MIN/1.73 M^2
EST. GFR  (NON AFRICAN AMERICAN): >60 ML/MIN/1.73 M^2
GLUCOSE SERPL-MCNC: 314 MG/DL
POCT GLUCOSE: 170 MG/DL (ref 70–110)
POCT GLUCOSE: 262 MG/DL (ref 70–110)
POCT GLUCOSE: 279 MG/DL (ref 70–110)
POCT GLUCOSE: 289 MG/DL (ref 70–110)
POCT GLUCOSE: 312 MG/DL (ref 70–110)
POTASSIUM SERPL-SCNC: 4.7 MMOL/L
SODIUM SERPL-SCNC: 132 MMOL/L

## 2017-09-14 PROCEDURE — 36415 COLL VENOUS BLD VENIPUNCTURE: CPT

## 2017-09-14 PROCEDURE — 25000003 PHARM REV CODE 250: Performed by: HOSPITALIST

## 2017-09-14 PROCEDURE — 63600175 PHARM REV CODE 636 W HCPCS: Performed by: HOSPITALIST

## 2017-09-14 PROCEDURE — 80048 BASIC METABOLIC PNL TOTAL CA: CPT

## 2017-09-14 PROCEDURE — S4991 NICOTINE PATCH NONLEGEND: HCPCS | Performed by: INTERNAL MEDICINE

## 2017-09-14 PROCEDURE — A4217 STERILE WATER/SALINE, 500 ML: HCPCS | Performed by: HOSPITALIST

## 2017-09-14 PROCEDURE — 63600175 PHARM REV CODE 636 W HCPCS: Performed by: INTERNAL MEDICINE

## 2017-09-14 PROCEDURE — 99232 SBSQ HOSP IP/OBS MODERATE 35: CPT | Mod: AF,HB,S$PBB, | Performed by: PSYCHIATRY & NEUROLOGY

## 2017-09-14 PROCEDURE — 25000003 PHARM REV CODE 250: Performed by: INTERNAL MEDICINE

## 2017-09-14 PROCEDURE — 12000002 HC ACUTE/MED SURGE SEMI-PRIVATE ROOM

## 2017-09-14 RX ORDER — INSULIN ASPART 100 [IU]/ML
8 INJECTION, SOLUTION INTRAVENOUS; SUBCUTANEOUS
Status: DISCONTINUED | OUTPATIENT
Start: 2017-09-14 | End: 2017-09-15

## 2017-09-14 RX ADMIN — DIVALPROEX SODIUM 500 MG: 250 TABLET, DELAYED RELEASE ORAL at 02:09

## 2017-09-14 RX ADMIN — INSULIN ASPART 8 UNITS: 100 INJECTION, SOLUTION INTRAVENOUS; SUBCUTANEOUS at 12:09

## 2017-09-14 RX ADMIN — INSULIN ASPART 6 UNITS: 100 INJECTION, SOLUTION INTRAVENOUS; SUBCUTANEOUS at 05:09

## 2017-09-14 RX ADMIN — DIVALPROEX SODIUM 500 MG: 250 TABLET, DELAYED RELEASE ORAL at 10:09

## 2017-09-14 RX ADMIN — NICOTINE 1 PATCH: 21 PATCH, EXTENDED RELEASE TRANSDERMAL at 08:09

## 2017-09-14 RX ADMIN — SODIUM BICARBONATE: 84 INJECTION, SOLUTION INTRAVENOUS at 06:09

## 2017-09-14 RX ADMIN — ENOXAPARIN SODIUM 40 MG: 100 INJECTION SUBCUTANEOUS at 04:09

## 2017-09-14 RX ADMIN — DIVALPROEX SODIUM 500 MG: 250 TABLET, DELAYED RELEASE ORAL at 05:09

## 2017-09-14 RX ADMIN — ACETAMINOPHEN 500 MG: 500 TABLET ORAL at 08:09

## 2017-09-14 RX ADMIN — SODIUM BICARBONATE: 84 INJECTION, SOLUTION INTRAVENOUS at 08:09

## 2017-09-14 RX ADMIN — SODIUM BICARBONATE: 84 INJECTION, SOLUTION INTRAVENOUS at 05:09

## 2017-09-14 RX ADMIN — ZIPRASIDONE HYDROCHLORIDE 40 MG: 20 CAPSULE ORAL at 10:09

## 2017-09-14 RX ADMIN — ONDANSETRON 8 MG: 2 INJECTION INTRAMUSCULAR; INTRAVENOUS at 08:09

## 2017-09-14 RX ADMIN — INSULIN ASPART 8 UNITS: 100 INJECTION, SOLUTION INTRAVENOUS; SUBCUTANEOUS at 05:09

## 2017-09-14 RX ADMIN — CLONIDINE HYDROCHLORIDE 0.2 MG: 0.1 TABLET ORAL at 10:09

## 2017-09-14 RX ADMIN — INSULIN ASPART 8 UNITS: 100 INJECTION, SOLUTION INTRAVENOUS; SUBCUTANEOUS at 07:09

## 2017-09-14 RX ADMIN — INSULIN DETEMIR 25 UNITS: 100 INJECTION, SOLUTION SUBCUTANEOUS at 10:09

## 2017-09-14 RX ADMIN — CLONIDINE HYDROCHLORIDE 0.2 MG: 0.1 TABLET ORAL at 08:09

## 2017-09-14 RX ADMIN — SODIUM CHLORIDE: 0.9 INJECTION, SOLUTION INTRAVENOUS at 06:09

## 2017-09-14 RX ADMIN — CLONIDINE HYDROCHLORIDE 0.2 MG: 0.1 TABLET ORAL at 09:09

## 2017-09-14 NOTE — ASSESSMENT & PLAN NOTE
States that he is currently in [facility] and will be able to return there after discharge.  Long history of heroin use but reportedly clean for almost a couple months.  Encourage sobriety and outpatient follow up.

## 2017-09-14 NOTE — HOSPITAL COURSE
MrDarya Miller is a 32 y.o. male with essential hypertension, new-onset diabetes mellitus (HbA1c 11.2% Sept 2017),schizophrenia, Bipolar 1 disorder, obesity (BMI 38.8), noncompliance with medication regimen, tobacco abuse, and history of heroin abuse who presents to [facility] ED with complaints of nausea & vomiting for two days, He was evaluated here twice in the last day or so with complaints of hyperglycemia per his PCP for which he was instructed to present to the ED. He had been complaining of polydipsia and polyuria for four days. He had some nausea with vomiting. He left twice before against medical advice.patient is admitted for DKA,was on insulin gtt  AG closed,he has been started on SQ insulin,but still is acidotic,changed NS to strile water,with bicarb,psychitary has been consulted and started on depakote and geodon,[facility].DM education has bene provided.he was taking only metformin,need insuli at D/C time with HbA1C of 11.2.    Cannot afford levemir and novolog, switched to 70/30 and relion R which is $25 at [facility] and instruction on syringe administation. Replacing potassium and ok for dc to [facility].

## 2017-09-14 NOTE — SUBJECTIVE & OBJECTIVE
Interval History: patient is asymptomatic,awake and alert.    Review of Systems   Constitutional: Negative for activity change and appetite change.   HENT: Negative for congestion and dental problem.    Eyes: Negative for itching.   Respiratory: Negative for apnea and chest tightness.    Cardiovascular: Negative for chest pain and leg swelling.   Gastrointestinal: Negative for abdominal distention, abdominal pain and anal bleeding.   Endocrine: Negative for cold intolerance.   Genitourinary: Negative for difficulty urinating and dysuria.   Musculoskeletal: Negative for arthralgias and back pain.   Skin: Negative for color change and pallor.   Allergic/Immunologic: Negative for environmental allergies and food allergies.   Neurological: Negative for dizziness and facial asymmetry.   Hematological: Negative for adenopathy. Does not bruise/bleed easily.   Psychiatric/Behavioral: Negative for agitation and behavioral problems.     Objective:     Vital Signs (Most Recent):  Temp: (!) 32 °F (0 °C) (09/14/17 0400)  Pulse: 96 (09/14/17 0600)  Resp: 20 (09/14/17 0600)  BP: (!) 120/55 (09/14/17 0600)  SpO2: 100 % (09/14/17 0600) Vital Signs (24h Range):  Temp:  [32 °F (0 °C)-98.9 °F (37.2 °C)] 32 °F (0 °C)  Pulse:  [79-99] 96  Resp:  [12-29] 20  SpO2:  [98 %-100 %] 100 %  BP: (108-186)/() 120/55     Weight: 126.3 kg (278 lb 7.1 oz)  Body mass index is 38.83 kg/m².    Intake/Output Summary (Last 24 hours) at 09/14/17 0657  Last data filed at 09/14/17 0600   Gross per 24 hour   Intake          3087.44 ml   Output             1750 ml   Net          1337.44 ml      Physical Exam   Constitutional: He is oriented to person, place, and time. No distress.   HENT:   Head: Atraumatic.   Eyes: EOM are normal. Pupils are equal, round, and reactive to light.   Neck: Normal range of motion. Neck supple.   Cardiovascular: Normal rate and regular rhythm.    Pulmonary/Chest: Effort normal and breath sounds normal.   Abdominal: Soft.  Bowel sounds are normal.   Musculoskeletal: Normal range of motion. He exhibits no edema or deformity.   Neurological: He is oriented to person, place, and time. No cranial nerve deficit.   Skin: Skin is warm and dry. Capillary refill takes more than 3 seconds. He is not diaphoretic.   Psychiatric: He has a normal mood and affect. His behavior is normal.       Significant Labs:   A1C:   Recent Labs  Lab 09/11/17  1410   HGBA1C 11.2*     BMP:   Recent Labs  Lab 09/13/17  1225 09/14/17  0231   * 314*   * 132*   K 4.5 4.7    106   CO2 12* 12*   BUN 10 11   CREATININE 1.3 1.3   CALCIUM 9.6 9.0   MG 2.2  --      CBC:   Recent Labs  Lab 09/12/17  1018 09/12/17  2339 09/13/17  0419   WBC 15.06* 15.40* 17.04*   HGB 16.0 16.3 16.4   HCT 46.1 47.3 47.5    367* 369*

## 2017-09-14 NOTE — PLAN OF CARE
"Problem: Patient Care Overview  Goal: Plan of Care Review  Outcome: Ongoing (interventions implemented as appropriate)  Insulin gtt dc's @ 1500, diet ordered, food well tolerated. VSS. Clonidine given x 2 for high SBP. Pt c/o headache, symptoms relieved by tylenol. Pt sleeped for most of shift. Self-given bed bath administered. Pt reports feeling "much better". Family at bedside for most of shift. No new injury/fall, family/patient updated as to POC, will continue to monitor.       "

## 2017-09-14 NOTE — PROGRESS NOTES
"[facility] [place]  Psychiatry  Progress Note    Patient Name: ROSSI Miller  MRN: [id number]   Code Status: No Order  Admission Date: 9/12/2017  Hospital Length of Stay: 1 days  Expected Discharge Date:   Attending Physician: Martha Chow MD  Primary Care Provider: [facility]    Current Legal Status: N/A    Patient information was obtained from patient and ER records.     Subjective:     Principal Problem:Diabetic ketoacidosis without coma    HPI: Patient ROSSI Miller presented to the ED 3 times in a matter of a couple days and left AMA twice before letting being admitted for his uncontrolled blood sugar.  He has been quiet and not participative with questioning since being in the hospital.  He does allow me to engage somewhat and provided some basic information.  He states that he wasn't feeling good but is "worried" about his health.  States that he now has "sugar problems".  Has a history of high blood pressure and schizophrenia.  Says that he takes a pill for each.  Was arrested for "pulling a gun" at police and says that he spent 4 months in "[place]" which is the mental health inpatient unit for some of the prisons.  He says that he was diagnosed with schizophrenia at that time and has been following up with [facility].  He used to take Seroquel but now takes Geodon but doesn't know the dose.  Also says that he takes a medication twice a day for his mood, which I am assuming is Depakote or some other mood stabilizer (he doesn't recognize the names).  Also says that he goes to [facility] for rehab to get off of heroin and has been clean for 2 months.  Denies any current depression, anxiety, ronna, or psychosis.  States that he has had symptoms of increased energy and hearing voices when he does drugs.  Also says that he has been better since being off heroin.  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  He is not very " "involved in conversation but does give some minimal information.  Also says that he gets a monthly check and has gotten one his entire life.  He went to 10th grade of special education classes.    Hospital Course: 9/14:  Patient ROSSI Miller has improved greatly today.  He is much more involved in conversation and appears intellectually simple.  He states that he is feeling much better and realizes that he will have to take care of his "sugar" when he goes home.  Nursing says that he has been calm and cooperative and they are about to attempt diabetic education and insulin training.  Patient is tolerating Geodon and Depakote without any adverse events or side effects.    Interval History: Improved overall    Psychotherapeutics     Start     Stop Route Frequency Ordered    09/13/17 2100  ziprasidone capsule 40 mg      -- Oral Nightly 09/13/17 1814    09/13/17 0401  ramelteon tablet 8 mg      -- Oral Nightly PRN 09/13/17 0302           Review of Systems   Constitutional: Negative for activity change and appetite change.   Respiratory: Negative for shortness of breath.    Cardiovascular: Negative for chest pain.   Gastrointestinal: Positive for nausea.   Musculoskeletal: Negative for myalgias.   Psychiatric/Behavioral: Negative for dysphoric mood, hallucinations, sleep disturbance and suicidal ideas.     Objective:     Vital Signs (Most Recent):  Temp: 97.5 °F (36.4 °C) (09/14/17 1534)  Pulse: 93 (09/14/17 1534)  Resp: 18 (09/14/17 1534)  BP: 137/84 (09/14/17 1534)  SpO2: 100 % (09/14/17 1534) Vital Signs (24h Range):  Temp:  [32 °F (0 °C)-98.9 °F (37.2 °C)] 97.5 °F (36.4 °C)  Pulse:  [] 93  Resp:  [12-29] 18  SpO2:  [99 %-100 %] 100 %  BP: (108-184)/() 137/84     Height: 5' 11" (180.3 cm)  Weight: 126.3 kg (278 lb 7.1 oz)  Body mass index is 38.83 kg/m².      Intake/Output Summary (Last 24 hours) at 09/14/17 1601  Last data filed at 09/14/17 0900   Gross per 24 hour   Intake          2006.25 ml   Output      " "        800 ml   Net          1206.25 ml       Physical Exam   Psychiatric:   EXAMINATION    CONSTITUTIONAL  General Appearance: hospital attire    MUSCULOSKELETAL  Muscle Strength and Tone: normal  Abnormal Involuntary Movements: none noted or endorsed  Gait and Station: not observed    PSYCHIATRIC MENTAL STATUS EXAM   Level of Consciousness: awake and alert  Orientation: name, place, month, year, situation  Grooming: limited  Psychomotor Behavior: somewhat slowed  Speech: soft volume and tone; delayed rate  Language: no abnormalities  Mood: "OK"  Affect: blunted; almost restricted  Thought Process: concrete  Associations: intact but simple  Thought Content: denies suicidal/homicidal/psychosis  Memory: intact to recent and remote  Attention: diminished  Fund of Knowledge: intact to conversation  Insight: poor into mental illness and medications/compliance  Judgment: poor into medications and compliance        Significant Labs:   Last 24 Hours:   Recent Lab Results       09/14/17  1220 09/14/17  0747 09/14/17  0524 09/14/17  0231 09/13/17  2131      Anion Gap    14      BUN, Bld    11      Calcium    9.0      Chloride    106      CO2    12(L)      Creatinine    1.3      eGFR if     >60      eGFR if non     >60  Comment:  Calculation used to obtain the estimated glomerular filtration  rate (eGFR) is the CKD-EPI equation. Since race is unknown   in our information system, the eGFR values for   -American and Non--American patients are given   for each creatinine result.        Glucose    314(H)      POCT Glucose 312(H) 289(H) 262(H)  302(H)     Potassium    4.7  Comment:  Specimen slightly hemolyzed      Sodium    132(L)                  09/13/17  1912      Anion Gap      BUN, Bld      Calcium      Chloride      CO2      Creatinine      eGFR if       eGFR if non African American      Glucose      POCT Glucose 283(H)     Potassium      Sodium          All " "pertinent labs within the past 24 hours have been reviewed.    Significant Imaging: I have reviewed all pertinent imaging results/findings within the past 24 hours.    Assessment/Plan:     Borderline intellectual functioning    Patient has a long history of "special education" classes and slowed learning.  He will need extra counseling and education about his medications and illnesses, particularly insulin therapy.        Heroin use disorder, severe, in early remission    States that he is currently in [facility] and will be able to return there after discharge.  Long history of heroin use but reportedly clean for almost a couple months.  Encourage sobriety and outpatient follow up.        Undifferentiated schizophrenia    Has history of schizophrenia but because he is a limited historian, unable to get clear picture of which type and degree of severity.  Being that he was in a retirement psychiatric unit, he likely has a criminal link to his mental illness.  Continue Geodon at 40mg nightly and Depakote at 500mg TID for now.  He currently does not meet criteria for inpatient psychiatric admission.  Upon discharge, he will be able to return to [facility] for outpatient follow up.               Need for Continued Hospitalization:   No need for inpatient psychiatric hospitalization. Continue medical care as per the primary team.    Anticipated Disposition: Home or Self Care    Hunter Cano MD   Psychiatry  [facility] [place]  "

## 2017-09-14 NOTE — SUBJECTIVE & OBJECTIVE
"Interval History: Improved overall    Psychotherapeutics     Start     Stop Route Frequency Ordered    09/13/17 2100  ziprasidone capsule 40 mg      -- Oral Nightly 09/13/17 1814    09/13/17 0401  ramelteon tablet 8 mg      -- Oral Nightly PRN 09/13/17 0302           Review of Systems   Constitutional: Negative for activity change and appetite change.   Respiratory: Negative for shortness of breath.    Cardiovascular: Negative for chest pain.   Gastrointestinal: Positive for nausea.   Musculoskeletal: Negative for myalgias.   Psychiatric/Behavioral: Negative for dysphoric mood, hallucinations, sleep disturbance and suicidal ideas.     Objective:     Vital Signs (Most Recent):  Temp: 97.5 °F (36.4 °C) (09/14/17 1534)  Pulse: 93 (09/14/17 1534)  Resp: 18 (09/14/17 1534)  BP: 137/84 (09/14/17 1534)  SpO2: 100 % (09/14/17 1534) Vital Signs (24h Range):  Temp:  [32 °F (0 °C)-98.9 °F (37.2 °C)] 97.5 °F (36.4 °C)  Pulse:  [] 93  Resp:  [12-29] 18  SpO2:  [99 %-100 %] 100 %  BP: (108-184)/() 137/84     Height: 5' 11" (180.3 cm)  Weight: 126.3 kg (278 lb 7.1 oz)  Body mass index is 38.83 kg/m².      Intake/Output Summary (Last 24 hours) at 09/14/17 1601  Last data filed at 09/14/17 0900   Gross per 24 hour   Intake          2006.25 ml   Output              800 ml   Net          1206.25 ml       Physical Exam   Psychiatric:   EXAMINATION    CONSTITUTIONAL  General Appearance: hospital attire    MUSCULOSKELETAL  Muscle Strength and Tone: normal  Abnormal Involuntary Movements: none noted or endorsed  Gait and Station: not observed    PSYCHIATRIC MENTAL STATUS EXAM   Level of Consciousness: awake and alert  Orientation: name, place, month, year, situation  Grooming: limited  Psychomotor Behavior: somewhat slowed  Speech: soft volume and tone; delayed rate  Language: no abnormalities  Mood: "OK"  Affect: blunted; almost restricted  Thought Process: concrete  Associations: intact but simple  Thought Content: denies " suicidal/homicidal/psychosis  Memory: intact to recent and remote  Attention: diminished  Fund of Knowledge: intact to conversation  Insight: poor into mental illness and medications/compliance  Judgment: poor into medications and compliance        Significant Labs:   Last 24 Hours:   Recent Lab Results       09/14/17  1220 09/14/17  0747 09/14/17  0524 09/14/17  0231 09/13/17  2131      Anion Gap    14      BUN, Bld    11      Calcium    9.0      Chloride    106      CO2    12(L)      Creatinine    1.3      eGFR if     >60      eGFR if non     >60  Comment:  Calculation used to obtain the estimated glomerular filtration  rate (eGFR) is the CKD-EPI equation. Since race is unknown   in our information system, the eGFR values for   -American and Non--American patients are given   for each creatinine result.        Glucose    314(H)      POCT Glucose 312(H) 289(H) 262(H)  302(H)     Potassium    4.7  Comment:  Specimen slightly hemolyzed      Sodium    132(L)                  09/13/17  1912      Anion Gap      BUN, Bld      Calcium      Chloride      CO2      Creatinine      eGFR if       eGFR if non African American      Glucose      POCT Glucose 283(H)     Potassium      Sodium          All pertinent labs within the past 24 hours have been reviewed.    Significant Imaging: I have reviewed all pertinent imaging results/findings within the past 24 hours.

## 2017-09-14 NOTE — PLAN OF CARE
Problem: Patient Care Overview  Goal: Plan of Care Review  Outcome: Ongoing (interventions implemented as appropriate)   09/14/17 2394   Coping/Psychosocial   Plan Of Care Reviewed With patient     Patient was admitted for DKA was on insulin drip yesterday and was discontinued as per order. Blood sugar controlled. On AC/HS blood sugar covered with sliding scale. Able to move independently on bed. No fall or skin breakdown noted.

## 2017-09-14 NOTE — CONSULTS
8:45 am--SW aware of patient consult to evaluate for insulin. Patient has PCP at [facility] and has insurance [facility]. SW unable to meet with patient at this time as he is vomiting, RN assisting patient. DERREK updated Dr Chow regarding patient information in bed huddle. Will need to address at later time with patient.

## 2017-09-14 NOTE — NURSING TRANSFER
Nursing Transfer Note      9/14/2017     Transfer To: [address]    Transfer via wheelchair    Transfer with none    Transported by [place]    Medicines sent: none    Chart send with patient: Yes    Notified: spouse    Patient reassessed at:  (9/14, 1000)    Upon arrival to floor: patient oriented to room, call bell in reach and bed in lowest position

## 2017-09-14 NOTE — ASSESSMENT & PLAN NOTE
Will monitor,need follow as out patient with his mental health clinic in [place],DM education has been consulted.

## 2017-09-14 NOTE — ASSESSMENT & PLAN NOTE
His hyperkalemia is likely due to both the transcellular potassium shifts often seen in DKA and acute renal failure.  He is already on an insulin infusion which will likely resolve his hyperkalemia.  Will check BNPs every 4 hours to assure resolution.  Resolved.

## 2017-09-14 NOTE — ASSESSMENT & PLAN NOTE
He was recently diagnosed with diabetes in the last couple days, so it is unclear what type of diabetes he is afflicted with.  Will obtain a CARMELINA-65 Ab, insulin, and C-peptide level to help elucidate his type of diabetes in order to guide outpatient therapy.   consulted Diabetes Education.HbA1c 11.2,need insulin at D/C time,spoked with nursing staff for insulin injection education.

## 2017-09-14 NOTE — ASSESSMENT & PLAN NOTE
"Patient has a long history of "special education" classes and slowed learning.  He will need extra counseling and education about his medications and illnesses, particularly insulin therapy.  "

## 2017-09-14 NOTE — ASSESSMENT & PLAN NOTE
This is Mr. Miller's third visit to the ED and his renal function has been worsening with each visit; I suspect that this is due to a pre-renal etiology such as osmotic diuresis from DKA.  Patient's urinalysis is significant for a specific gravity of 1.020, 1+ protein, 3+ glucose, 2+ ketones, and 2+ occult blood.  Urine output has been poor.  Will obtain additional urine studies; provide aggressive IV fluid hydration; monitor the urine output; recheck the renal function in the morning; and avoid nephrotoxins.  ARF resolved.

## 2017-09-14 NOTE — HOSPITAL COURSE
"9/14:  Patient ROSSI Miller has improved greatly today.  He is much more involved in conversation and appears intellectually simple.  He states that he is feeling much better and realizes that he will have to take care of his "sugar" when he goes home.  Nursing says that he has been calm and cooperative and they are about to attempt diabetic education and insulin training.  Patient is tolerating Geodon and Depakote without any adverse events or side effects.    9/15:  Spoke with patient again today.  Says that he is doing fine and ready to leave.  Nurse states that he remains simple but is cooperative with care.  Diabetic teaching today.  "

## 2017-09-14 NOTE — CARE UPDATE
Mr. ROSSI Miller is a 32 y.o. male with essential hypertension, new-onset diabetes mellitus (HbA1c 11.2% Sept 2017),schizophrenia, Bipolar 1 disorder, obesity (BMI 38.8), noncompliance with medication regimen, tobacco abuse, and history of heroin abuse who presents to [facility] ED with complaints of nausea & vomiting for two days, He was evaluated here twice in the last day or so with complaints of hyperglycemia per his PCP for which he was instructed to present to the ED. He had been complaining of polydipsia and polyuria for four days. He had some nausea with vomiting. He left twice before against medical advice.patient is admitted for DKA,was on insulin gtt  AG closed,he has been started on SQ insulin,but still is acidotic,changed NS to strile water,with bicarb,psychitary has been consulted and started on depakote and geodon,[facility].DM education has bene provided.he was taking only metformin,need insuli at D/C time with HbA1C of 11.2,consulted SW for insulin need.spoke with nursing educating for insulin administration.

## 2017-09-14 NOTE — PROGRESS NOTES
Received from ICU via w/c with asst. transport. Assisted to bed. Oriented to room. Iv fluids restarted as ordered. Call light in reach.

## 2017-09-14 NOTE — ASSESSMENT & PLAN NOTE
Patient was with evidence of DKA given hyperglycemia, high anion gap metabolic acidosis, glucosuria, ketonuria, and elevated beta-hydroxybutyric acid.  Will first administer a regular insulin bolus and start weight-based insulin infusion until the anion-gap returns to normal and blood glucose is stabilized.  Will check hourly AccuCheks and check BMPs every 4 hours.  Will replace potassium as necessary.  Will start basal-bolus insulin therapy thereafter as well as an oral diet.  Etiology appears to be new-onset diabetes.  DKA resolved,on SQ insulin,but still is very acidotic,D/C NS,started on sterile water with bicarb,consulted SW for insulin arrangement and evaluation.

## 2017-09-14 NOTE — PROGRESS NOTES
[facility] Hospital Medicine  Progress Note    Patient Name: ROSSI Miller  MRN: [id number]  Patient Class: IP- Inpatient   Admission Date: 9/12/2017  Length of Stay: 1 days  Attending Physician: Martha Chow MD  Primary Care Provider: [facility]        Subjective:     Principal Problem:Diabetic ketoacidosis without coma    HPI:  Mr. ROSSI Miller is a 32 y.o. male with essential hypertension, new-onset diabetes mellitus (HbA1c 11.2% Sept 2017), Bipolar 1 disorder, obesity (BMI 38.8), noncompliance with medication regimen, tobacco abuse, and history of heroin abuse who presents to [facility] ED with complaints of nausea & vomiting for two days.  He is minimally participatory in his interview so the information is obtained via chart review.  He was evaluated here twice in the last day or so with complaints of hyperglycemia per his PCP for which he was instructed to present to the ED.  He had been complaining of polydipsia and polyuria for four days.  He also had a headache.  He had some nausea with vomiting.  He left twice before against medical advice.  Further history is limited at this time.    Hospital Course:  Mr. ROSSI Miller is a 32 y.o. male with essential hypertension, new-onset diabetes mellitus (HbA1c 11.2% Sept 2017),schizophrenia, Bipolar 1 disorder, obesity (BMI 38.8), noncompliance with medication regimen, tobacco abuse, and history of heroin abuse who presents to [facility] ED with complaints of nausea & vomiting for two days, He was evaluated here twice in the last day or so with complaints of hyperglycemia per his PCP for which he was instructed to present to the ED. He had been complaining of polydipsia and polyuria for four days. He had some nausea with vomiting. He left twice before against medical advice.patient is admitted for DKA,was on insulin gtt  AG closed,he has been started on SQ insulin,but still is acidotic,changed NS to strile water,with bicarb,psychitary has been  consulted and started on depakote and geodon,need follow with mental [facility] [place] clinic as out patient.DM education has bene provided.he was taking only metformin,need insuli at D/C time with HbA1C of 11.2.    Interval History: patient is asymptomatic,awake and alert.    Review of Systems   Constitutional: Negative for activity change and appetite change.   HENT: Negative for congestion and dental problem.    Eyes: Negative for itching.   Respiratory: Negative for apnea and chest tightness.    Cardiovascular: Negative for chest pain and leg swelling.   Gastrointestinal: Negative for abdominal distention, abdominal pain and anal bleeding.   Endocrine: Negative for cold intolerance.   Genitourinary: Negative for difficulty urinating and dysuria.   Musculoskeletal: Negative for arthralgias and back pain.   Skin: Negative for color change and pallor.   Allergic/Immunologic: Negative for environmental allergies and food allergies.   Neurological: Negative for dizziness and facial asymmetry.   Hematological: Negative for adenopathy. Does not bruise/bleed easily.   Psychiatric/Behavioral: Negative for agitation and behavioral problems.     Objective:     Vital Signs (Most Recent):  Temp: (!) 32 °F (0 °C) (09/14/17 0400)  Pulse: 96 (09/14/17 0600)  Resp: 20 (09/14/17 0600)  BP: (!) 120/55 (09/14/17 0600)  SpO2: 100 % (09/14/17 0600) Vital Signs (24h Range):  Temp:  [32 °F (0 °C)-98.9 °F (37.2 °C)] 32 °F (0 °C)  Pulse:  [79-99] 96  Resp:  [12-29] 20  SpO2:  [98 %-100 %] 100 %  BP: (108-186)/() 120/55     Weight: 126.3 kg (278 lb 7.1 oz)  Body mass index is 38.83 kg/m².    Intake/Output Summary (Last 24 hours) at 09/14/17 0657  Last data filed at 09/14/17 0600   Gross per 24 hour   Intake          3087.44 ml   Output             1750 ml   Net          1337.44 ml      Physical Exam   Constitutional: He is oriented to person, place, and time. No distress.   HENT:   Head: Atraumatic.   Eyes: EOM are normal. Pupils are equal,  round, and reactive to light.   Neck: Normal range of motion. Neck supple.   Cardiovascular: Normal rate and regular rhythm.    Pulmonary/Chest: Effort normal and breath sounds normal.   Abdominal: Soft. Bowel sounds are normal.   Musculoskeletal: Normal range of motion. He exhibits no edema or deformity.   Neurological: He is oriented to person, place, and time. No cranial nerve deficit.   Skin: Skin is warm and dry. Capillary refill takes more than 3 seconds. He is not diaphoretic.   Psychiatric: He has a normal mood and affect. His behavior is normal.       Significant Labs:   A1C:   Recent Labs  Lab 09/11/17  1410   HGBA1C 11.2*     BMP:   Recent Labs  Lab 09/13/17  1225 09/14/17  0231   * 314*   * 132*   K 4.5 4.7    106   CO2 12* 12*   BUN 10 11   CREATININE 1.3 1.3   CALCIUM 9.6 9.0   MG 2.2  --      CBC:   Recent Labs  Lab 09/12/17  1018 09/12/17  2339 09/13/17  0419   WBC 15.06* 15.40* 17.04*   HGB 16.0 16.3 16.4   HCT 46.1 47.3 47.5    367* 369*           Assessment/Plan:      * Diabetic ketoacidosis without coma    Patient was with evidence of DKA given hyperglycemia, high anion gap metabolic acidosis, glucosuria, ketonuria, and elevated beta-hydroxybutyric acid.  Will first administer a regular insulin bolus and start weight-based insulin infusion until the anion-gap returns to normal and blood glucose is stabilized.  Will check hourly AccuCheks and check BMPs every 4 hours.  Will replace potassium as necessary.  Will start basal-bolus insulin therapy thereafter as well as an oral diet.  Etiology appears to be new-onset diabetes.  DKA resolved,on SQ insulin,but still is very acidotic,D/C NS,started on sterile water with bicarb,consulted SW for insulin arrangement and evaluation.        Borderline intellectual functioning    Will monitor,need follow as out patient with his mental health clinic in [place],DM education has been consulted.          Heroin use disorder, severe,  in early remission    Stable; there are no acute issues.        Tobacco abuse    Patient was counseled on smoking cessation and he will be provided a nicotine transdermal patch applied while inpatient.  Will provide additional smoking cessation counseling prior to discharge.        Noncompliance with medication regimen    He has been counseled and encouraged to adhere to his medication regimen,DM education, but exhibits poor insight into his medical condition.          Undifferentiated schizophrenia    He exhibits behaviors of paranoia and delusions , Psychiatry consulted and started on Geodon and Depakote.he say he was on geodon at home,but he is a poor historian.        Diabetes mellitus, new onset    He was recently diagnosed with diabetes in the last couple days, so it is unclear what type of diabetes he is afflicted with.  Will obtain a CARMELINA-65 Ab, insulin, and C-peptide level to help elucidate his type of diabetes in order to guide outpatient therapy.   consulted Diabetes Education.HbA1c 11.2,need insulin at D/C time,spoked with nursing staff for insulin injection education.        Essential hypertension    Patient's blood pressure is poorly-controlled.  He is on clonidine but I doubt he is compliant with this.  Will provide as-needed clonidine.        Hyperkalemia    His hyperkalemia is likely due to both the transcellular potassium shifts often seen in DKA and acute renal failure.  He is already on an insulin infusion which will likely resolve his hyperkalemia.  Will check BNPs every 4 hours to assure resolution.  Resolved.        Acute renal failure    This is Mr. Miller's third visit to the ED and his renal function has been worsening with each visit; I suspect that this is due to a pre-renal etiology such as osmotic diuresis from DKA.  Patient's urinalysis is significant for a specific gravity of 1.020, 1+ protein, 3+ glucose, 2+ ketones, and 2+ occult blood.  Urine output has been poor.  Will obtain  additional urine studies; provide aggressive IV fluid hydration; monitor the urine output; recheck the renal function in the morning; and avoid nephrotoxins.  ARF resolved.          VTE Risk Mitigation         Ordered     enoxaparin injection 40 mg  Daily     Route:  Subcutaneous        09/13/17 1332     Medium Risk of VTE  Once      09/13/17 0301          Critical care time spent on the evaluation and treatment of severe organ dysfunction, review of pertinent labs and imaging studies, discussions with consulting providers and discussions with patient/family: 30  minutes.    Martha Chow MD  Department of Hospital Medicine   [facility]

## 2017-09-14 NOTE — ASSESSMENT & PLAN NOTE
He has been counseled and encouraged to adhere to his medication regimen,DM education, but exhibits poor insight into his medical condition.

## 2017-09-14 NOTE — ASSESSMENT & PLAN NOTE
Has history of schizophrenia but because he is a limited historian, unable to get clear picture of which type and degree of severity.  Being that he was in a [facility] psychiatric unit, he likely has a criminal link to his mental illness.  Continue Geodon at 40mg nightly and Depakote at 500mg TID for now.  He currently does not meet criteria for inpatient psychiatric admission.  Upon discharge, he will be able to return to [facility] for outpatient follow up.

## 2017-09-14 NOTE — ASSESSMENT & PLAN NOTE
He exhibits behaviors of paranoia and delusions , Psychiatry consulted and started on Geodon and Depakote.he say he was on geodon at home,but he is a poor historian.

## 2017-09-15 PROBLEM — E87.6 HYPOKALEMIA: Status: ACTIVE | Noted: 2017-09-15

## 2017-09-15 LAB
ANION GAP SERPL CALC-SCNC: 12 MMOL/L
BUN SERPL-MCNC: 11 MG/DL
CALCIUM SERPL-MCNC: 8.7 MG/DL
CHLORIDE SERPL-SCNC: 100 MMOL/L
CO2 SERPL-SCNC: 25 MMOL/L
CREAT SERPL-MCNC: 0.9 MG/DL
EST. GFR  (AFRICAN AMERICAN): >60 ML/MIN/1.73 M^2
EST. GFR  (NON AFRICAN AMERICAN): >60 ML/MIN/1.73 M^2
GAD65 AB SER-SCNC: 0 NMOL/L
GLUCOSE SERPL-MCNC: 197 MG/DL
POCT GLUCOSE: 227 MG/DL (ref 70–110)
POCT GLUCOSE: 257 MG/DL (ref 70–110)
POCT GLUCOSE: 263 MG/DL (ref 70–110)
POCT GLUCOSE: 317 MG/DL (ref 70–110)
POTASSIUM SERPL-SCNC: 2.9 MMOL/L
SODIUM SERPL-SCNC: 137 MMOL/L

## 2017-09-15 PROCEDURE — 12000002 HC ACUTE/MED SURGE SEMI-PRIVATE ROOM

## 2017-09-15 PROCEDURE — 80048 BASIC METABOLIC PNL TOTAL CA: CPT

## 2017-09-15 PROCEDURE — 63600175 PHARM REV CODE 636 W HCPCS: Performed by: HOSPITALIST

## 2017-09-15 PROCEDURE — 36415 COLL VENOUS BLD VENIPUNCTURE: CPT

## 2017-09-15 PROCEDURE — 25000003 PHARM REV CODE 250: Performed by: HOSPITALIST

## 2017-09-15 PROCEDURE — 99231 SBSQ HOSP IP/OBS SF/LOW 25: CPT | Mod: AF,HB,S$PBB, | Performed by: PSYCHIATRY & NEUROLOGY

## 2017-09-15 RX ORDER — INSULIN ASPART 100 [IU]/ML
20 INJECTION, SUSPENSION SUBCUTANEOUS 2 TIMES DAILY WITH MEALS
Status: DISCONTINUED | OUTPATIENT
Start: 2017-09-16 | End: 2017-09-16

## 2017-09-15 RX ORDER — POTASSIUM CHLORIDE 20 MEQ/1
60 TABLET, EXTENDED RELEASE ORAL ONCE
Status: COMPLETED | OUTPATIENT
Start: 2017-09-15 | End: 2017-09-15

## 2017-09-15 RX ORDER — INSULIN ASPART 100 [IU]/ML
10 INJECTION, SOLUTION INTRAVENOUS; SUBCUTANEOUS 3 TIMES DAILY
Qty: 9 ML | Refills: 11 | Status: SHIPPED | OUTPATIENT
Start: 2017-09-15 | End: 2017-09-24

## 2017-09-15 RX ORDER — INSULIN ASPART 100 [IU]/ML
10 INJECTION, SOLUTION INTRAVENOUS; SUBCUTANEOUS
Status: DISCONTINUED | OUTPATIENT
Start: 2017-09-15 | End: 2017-09-15

## 2017-09-15 RX ADMIN — INSULIN ASPART 8 UNITS: 100 INJECTION, SOLUTION INTRAVENOUS; SUBCUTANEOUS at 11:09

## 2017-09-15 RX ADMIN — INSULIN ASPART 10 UNITS: 100 INJECTION, SOLUTION INTRAVENOUS; SUBCUTANEOUS at 05:09

## 2017-09-15 RX ADMIN — DIVALPROEX SODIUM 500 MG: 250 TABLET, DELAYED RELEASE ORAL at 06:09

## 2017-09-15 RX ADMIN — INSULIN ASPART 8 UNITS: 100 INJECTION, SOLUTION INTRAVENOUS; SUBCUTANEOUS at 08:09

## 2017-09-15 RX ADMIN — CLONIDINE HYDROCHLORIDE 0.2 MG: 0.1 TABLET ORAL at 10:09

## 2017-09-15 RX ADMIN — DIVALPROEX SODIUM 500 MG: 250 TABLET, DELAYED RELEASE ORAL at 01:09

## 2017-09-15 RX ADMIN — DIVALPROEX SODIUM 500 MG: 250 TABLET, DELAYED RELEASE ORAL at 10:09

## 2017-09-15 RX ADMIN — INSULIN ASPART 4 UNITS: 100 INJECTION, SOLUTION INTRAVENOUS; SUBCUTANEOUS at 08:09

## 2017-09-15 RX ADMIN — INSULIN ASPART 8 UNITS: 100 INJECTION, SOLUTION INTRAVENOUS; SUBCUTANEOUS at 12:09

## 2017-09-15 RX ADMIN — POTASSIUM CHLORIDE 60 MEQ: 1500 TABLET, EXTENDED RELEASE ORAL at 03:09

## 2017-09-15 RX ADMIN — ZIPRASIDONE HYDROCHLORIDE 40 MG: 20 CAPSULE ORAL at 10:09

## 2017-09-15 RX ADMIN — INSULIN ASPART 6 UNITS: 100 INJECTION, SOLUTION INTRAVENOUS; SUBCUTANEOUS at 05:09

## 2017-09-15 NOTE — PLAN OF CARE
Problem: Patient Care Overview  Goal: Plan of Care Review  Outcome: Ongoing (interventions implemented as appropriate)  Pt.monitored freq.throughout the shift. Resting at present with no complaints. Iv fluids with NaBicarb continue as ordered. Continue to monitor blood sugar & give sched,insulin & ssi as ordered. Pt. With history diabetes & new to insulin. Diabetic education started with pt. on giving insulin with insulin pen( ? Possibility of going home on insulin). Pt. not focusing-fussing with girlfriend at bedside &/or on phone when trying to educate pt. Call light in reach  Report oncoming shift.

## 2017-09-15 NOTE — PLAN OF CARE
Patient is an ICU transfer to med-surg floor. SW met with patient to introduce self and explain role. Patient reported he still have 11 days left in treatment @ [facility] and he will discharge there when medically stable to leave the hospital. Appointment scheduled with [facility] for PCP follow-up.         09/15/17 1411   Discharge Reassessment   Assessment Type Discharge Planning Reassessment   Provided patient/caregiver education on the expected discharge date and the discharge plan No   Do you have any problems affording any of your prescribed medications? TBD   Discharge Plan A Other  ([facility])   Discharge Plan B Other   Patient choice form signed by patient/caregiver N/A

## 2017-09-15 NOTE — ASSESSMENT & PLAN NOTE
Has history of schizophrenia but because he is a limited historian, unable to get clear picture of which type and degree of severity.  Being that he was in a senior living psychiatric unit, he likely has a criminal link to his mental illness.  Continue Geodon at 40mg nightly and Depakote at 500mg TID for now.  He currently does not meet criteria for inpatient psychiatric admission.  Upon discharge, he will be able to return to [facility] for outpatient follow up.

## 2017-09-15 NOTE — PROGRESS NOTES
[facility] [address] [place] [address] - [facility] [place] [address]  Phone: [phone] Fax: [phone]    DERREK faxed prescription for insulin to [facility] pharmacy @ [phone] to obtain co-pay price.    Addendum: DERREK spoke to Candace with [facility] pharmacy that reported Levemir and Novolog insulin will require a prior-authorization. DERREK contacted attending MD to report insulin will require a prior-authorization. Attending MD informed SW she will switch insulin to relion 70/30 to see how patient will respond to treatment.

## 2017-09-15 NOTE — PLAN OF CARE
Problem: Patient Care Overview  Goal: Plan of Care Review  Outcome: Ongoing (interventions implemented as appropriate)  Rsoy Bennett RN Registered Nurse Signed Med/Surg  Nursing          []Hide copied text  []Hover for attribution information  Pt has remained free from falls and/or signs of infection this shift. Pt has remained within his comfort zone this shift. Will continue to monitor.               Comments:   Rosy Bennett RN Registered Nurse Signed Med/Surg  Nursing          []Hide copied text  []Hover for attribution information  Pt has remained free from falls and/or signs of infection this shift. Pt has remained within his comfort zone this shift. Will continue to monitor.

## 2017-09-15 NOTE — ASSESSMENT & PLAN NOTE
States that he is currently in [facility] and will be able to return there after discharge.  Long history of heroin use but reportedly clean for almost a couple months.  Encourage sobriety and outpatient follow up.

## 2017-09-15 NOTE — PLAN OF CARE
Problem: Patient Care Overview  Goal: Plan of Care Review  Outcome: Ongoing (interventions implemented as appropriate)   09/15/17 7800   Coping/Psychosocial   Plan Of Care Reviewed With patient   Cbg 317 Discussed diabetic diet with small frequent meals and cup with measurement reviewed.Sign and symptoms of hypoglycemia via hyperglycemia reaction Handout given.  Denies pain of discomfort. Steady gait present. Calm and cooperative

## 2017-09-15 NOTE — PROGRESS NOTES
"[facility]  Psychiatry  Progress Note    Patient Name: ROSSI Miller  MRN: [id number]   Code Status: No Order  Admission Date: 9/12/2017  Hospital Length of Stay: 2 days  Expected Discharge Date:   Attending Physician: Jina Gonzales MD  Primary Care Provider: [facility]    Current Legal Status: N/A    Patient information was obtained from patient and ER records.     Subjective:     Principal Problem:Diabetic ketoacidosis without coma    HPI: Patient ROSSI Miller presented to the ED 3 times in a matter of a couple days and left AMA twice before letting being admitted for his uncontrolled blood sugar.  He has been quiet and not participative with questioning since being in the hospital.  He does allow me to engage somewhat and provided some basic information.  He states that he wasn't feeling good but is "worried" about his health.  States that he now has "sugar problems".  Has a history of high blood pressure and schizophrenia.  Says that he takes a pill for each.  Was arrested for "pulling a gun" at police and says that he spent 4 months in "[place]" which is the mental health inpatient unit for some of the prisons.  He says that he was diagnosed with schizophrenia at that time and has been following up with [facility].  He used to take Seroquel but now takes Geodon but doesn't know the dose.  Also says that he takes a medication twice a day for his mood, which I am assuming is Depakote or some other mood stabilizer (he doesn't recognize the names).  Also says that he goes to [facility] for rehab to get off of heroin and has been clean for 2 months.  Denies any current depression, anxiety, ronna, or psychosis.  States that he has had symptoms of increased energy and hearing voices when he does drugs.  Also says that he has been better since being off heroin.  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  He is not very involved " "in conversation but does give some minimal information.  Also says that he gets a monthly check and has gotten one his entire life.  He went to 10th grade of special education classes.    Hospital Course: 9/14:  Patient ROSSI Miller has improved greatly today.  He is much more involved in conversation and appears intellectually simple.  He states that he is feeling much better and realizes that he will have to take care of his "sugar" when he goes home.  Nursing says that he has been calm and cooperative and they are about to attempt diabetic education and insulin training.  Patient is tolerating Geodon and Depakote without any adverse events or side effects.    9/15:  Spoke with patient again today.  Says that he is doing fine and ready to leave.  Nurse states that he remains simple but is cooperative with care.  Diabetic teaching today.    Interval History: improved; tolerating meds      Psychotherapeutics     Start     Stop Route Frequency Ordered    09/13/17 2100  ziprasidone capsule 40 mg      -- Oral Nightly 09/13/17 1814    09/13/17 0401  ramelteon tablet 8 mg      -- Oral Nightly PRN 09/13/17 0302           Review of Systems   Constitutional: Negative for activity change and appetite change.   Respiratory: Negative for shortness of breath.    Cardiovascular: Negative for chest pain.   Gastrointestinal: Negative for nausea.   Musculoskeletal: Negative for myalgias.   Psychiatric/Behavioral: Negative for dysphoric mood, hallucinations, sleep disturbance and suicidal ideas.     Objective:     Vital Signs (Most Recent):  Temp: 98.3 °F (36.8 °C) (09/15/17 0816)  Pulse: 79 (09/15/17 0816)  Resp: 18 (09/15/17 0816)  BP: 129/68 (09/15/17 0816)  SpO2: 100 % (09/15/17 0816) Vital Signs (24h Range):  Temp:  [97.5 °F (36.4 °C)-98.7 °F (37.1 °C)] 98.3 °F (36.8 °C)  Pulse:  [] 79  Resp:  [18] 18  SpO2:  [98 %-100 %] 100 %  BP: ()/(54-84) 129/68     Height: 5' 11" (180.3 cm)  Weight: 126.3 kg (278 lb 7.1 " "oz)  Body mass index is 38.83 kg/m².      Intake/Output Summary (Last 24 hours) at 09/15/17 1439  Last data filed at 09/15/17 0600   Gross per 24 hour   Intake                0 ml   Output                0 ml   Net                0 ml       Physical Exam   Psychiatric:   EXAMINATION    CONSTITUTIONAL  General Appearance: hospital attire    MUSCULOSKELETAL  Muscle Strength and Tone: normal  Abnormal Involuntary Movements: none noted or endorsed  Gait and Station: not observed    PSYCHIATRIC MENTAL STATUS EXAM   Level of Consciousness: awake and alert  Orientation: name, place, month, year, situation  Grooming: limited  Psychomotor Behavior: somewhat slowed  Speech: soft volume and tone; delayed rate  Language: no abnormalities  Mood: "OK"  Affect: blunted; almost restricted  Thought Process: concrete  Associations: intact but simple  Thought Content: denies suicidal/homicidal/psychosis  Memory: intact to recent and remote  Attention: diminished  Fund of Knowledge: intact to conversation  Insight: poor into mental illness and medications/compliance  Judgment: poor into medications and compliance        Significant Labs:   Last 24 Hours:   Recent Lab Results       09/15/17  1137 09/15/17  0820 09/15/17  0346 09/14/17  2009 09/14/17  1700      Anion Gap   12       BUN, Bld   11       Calcium   8.7       Chloride   100       CO2   25       Creatinine   0.9       eGFR if    >60       eGFR if non    >60  Comment:  Calculation used to obtain the estimated glomerular filtration  rate (eGFR) is the CKD-EPI equation. Since race is unknown   in our information system, the eGFR values for   -American and Non--American patients are given   for each creatinine result.         Glucose   197(H)       POCT Glucose 317(H) 227(H)  170(H) 279(H)     Potassium   2.9(L)       Sodium   137                     All pertinent labs within the past 24 hours have been reviewed.    Significant " "Imaging: I have reviewed all pertinent imaging results/findings within the past 24 hours.    Assessment/Plan:     Borderline intellectual functioning    Patient has a long history of "special education" classes and slowed learning.  He will need extra counseling and education about his medications and illnesses, particularly insulin therapy.        Heroin use disorder, severe, in early remission    States that he is currently in [facility] and will be able to return there after discharge.  Long history of heroin use but reportedly clean for almost a couple months.  Encourage sobriety and outpatient follow up.        Undifferentiated schizophrenia    Has history of schizophrenia but because he is a limited historian, unable to get clear picture of which type and degree of severity.  Being that he was in a [facility] psychiatric unit, he likely has a criminal link to his mental illness.  Continue Geodon at 40mg nightly and Depakote at 500mg TID for now.  He currently does not meet criteria for inpatient psychiatric admission.  Upon discharge, he will be able to return to [facility] for outpatient follow up.               Need for Continued Hospitalization:   No need for inpatient psychiatric hospitalization. Continue medical care as per the primary team.    Anticipated Disposition: Home or Self Care    Hunter Cano MD   Psychiatry  [facility]  "

## 2017-09-15 NOTE — CONSULTS
Food & Nutrition  Education    Diet Education:  DM education  Time Spent:  15 minutes  Learners:  patient      Nutrition Education provided with handouts:   Type 2 Diabetes Nutrition Therapy  Using Nutrition Labels:  Carbohydrate   2,000 Calorie Meal Plan    Comments:  Learning barriers secondary to psychiatric problems per medical history.       All questions and concerns answered. Dietitian's contact information provided.       Follow-Up:  09/22/2017    Please Re-consult as needed      Becki Noyola, MPH, RDN, LDN

## 2017-09-15 NOTE — NURSING
Pt has remained free from falls and/or signs of infection this shift. Pt has remained within his comfort zone this shift. Will continue to monitor.

## 2017-09-15 NOTE — SUBJECTIVE & OBJECTIVE
"Interval History: improved; tolerating meds      Psychotherapeutics     Start     Stop Route Frequency Ordered    09/13/17 2100  ziprasidone capsule 40 mg      -- Oral Nightly 09/13/17 1814    09/13/17 0401  ramelteon tablet 8 mg      -- Oral Nightly PRN 09/13/17 0302           Review of Systems   Constitutional: Negative for activity change and appetite change.   Respiratory: Negative for shortness of breath.    Cardiovascular: Negative for chest pain.   Gastrointestinal: Negative for nausea.   Musculoskeletal: Negative for myalgias.   Psychiatric/Behavioral: Negative for dysphoric mood, hallucinations, sleep disturbance and suicidal ideas.     Objective:     Vital Signs (Most Recent):  Temp: 98.3 °F (36.8 °C) (09/15/17 0816)  Pulse: 79 (09/15/17 0816)  Resp: 18 (09/15/17 0816)  BP: 129/68 (09/15/17 0816)  SpO2: 100 % (09/15/17 0816) Vital Signs (24h Range):  Temp:  [97.5 °F (36.4 °C)-98.7 °F (37.1 °C)] 98.3 °F (36.8 °C)  Pulse:  [] 79  Resp:  [18] 18  SpO2:  [98 %-100 %] 100 %  BP: ()/(54-84) 129/68     Height: 5' 11" (180.3 cm)  Weight: 126.3 kg (278 lb 7.1 oz)  Body mass index is 38.83 kg/m².      Intake/Output Summary (Last 24 hours) at 09/15/17 1439  Last data filed at 09/15/17 0600   Gross per 24 hour   Intake                0 ml   Output                0 ml   Net                0 ml       Physical Exam   Psychiatric:   EXAMINATION    CONSTITUTIONAL  General Appearance: hospital attire    MUSCULOSKELETAL  Muscle Strength and Tone: normal  Abnormal Involuntary Movements: none noted or endorsed  Gait and Station: not observed    PSYCHIATRIC MENTAL STATUS EXAM   Level of Consciousness: awake and alert  Orientation: name, place, month, year, situation  Grooming: limited  Psychomotor Behavior: somewhat slowed  Speech: soft volume and tone; delayed rate  Language: no abnormalities  Mood: "OK"  Affect: blunted; almost restricted  Thought Process: concrete  Associations: intact but simple  Thought " Content: denies suicidal/homicidal/psychosis  Memory: intact to recent and remote  Attention: diminished  Fund of Knowledge: intact to conversation  Insight: poor into mental illness and medications/compliance  Judgment: poor into medications and compliance        Significant Labs:   Last 24 Hours:   Recent Lab Results       09/15/17  1137 09/15/17  0820 09/15/17  0346 09/14/17 2009 09/14/17  1700      Anion Gap   12       BUN, Bld   11       Calcium   8.7       Chloride   100       CO2   25       Creatinine   0.9       eGFR if    >60       eGFR if non    >60  Comment:  Calculation used to obtain the estimated glomerular filtration  rate (eGFR) is the CKD-EPI equation. Since race is unknown   in our information system, the eGFR values for   -American and Non--American patients are given   for each creatinine result.         Glucose   197(H)       POCT Glucose 317(H) 227(H)  170(H) 279(H)     Potassium   2.9(L)       Sodium   137                     All pertinent labs within the past 24 hours have been reviewed.    Significant Imaging: I have reviewed all pertinent imaging results/findings within the past 24 hours.

## 2017-09-15 NOTE — NURSING
Hand out given to patient regarding diabetes. Insulin given per patient  with instructions on techniques

## 2017-09-16 VITALS
OXYGEN SATURATION: 98 % | DIASTOLIC BLOOD PRESSURE: 82 MMHG | HEART RATE: 86 BPM | HEIGHT: 71 IN | WEIGHT: 278.44 LBS | BODY MASS INDEX: 38.98 KG/M2 | TEMPERATURE: 99 F | RESPIRATION RATE: 18 BRPM | SYSTOLIC BLOOD PRESSURE: 134 MMHG

## 2017-09-16 PROBLEM — E87.5 HYPERKALEMIA: Status: RESOLVED | Noted: 2017-09-13 | Resolved: 2017-09-16

## 2017-09-16 PROBLEM — N17.9 ACUTE RENAL FAILURE: Status: RESOLVED | Noted: 2017-09-13 | Resolved: 2017-09-16

## 2017-09-16 PROBLEM — E11.10 DIABETIC KETOACIDOSIS WITHOUT COMA: Status: RESOLVED | Noted: 2017-09-13 | Resolved: 2017-09-16

## 2017-09-16 LAB
ANION GAP SERPL CALC-SCNC: 13 MMOL/L
BUN SERPL-MCNC: 9 MG/DL
CALCIUM SERPL-MCNC: 8.7 MG/DL
CHLORIDE SERPL-SCNC: 99 MMOL/L
CO2 SERPL-SCNC: 25 MMOL/L
CREAT SERPL-MCNC: 0.9 MG/DL
EST. GFR  (AFRICAN AMERICAN): >60 ML/MIN/1.73 M^2
EST. GFR  (NON AFRICAN AMERICAN): >60 ML/MIN/1.73 M^2
GLUCOSE SERPL-MCNC: 294 MG/DL
POCT GLUCOSE: 282 MG/DL (ref 70–110)
POCT GLUCOSE: 326 MG/DL (ref 70–110)
POTASSIUM SERPL-SCNC: 3.1 MMOL/L
SODIUM SERPL-SCNC: 137 MMOL/L

## 2017-09-16 PROCEDURE — 25000003 PHARM REV CODE 250: Performed by: HOSPITALIST

## 2017-09-16 PROCEDURE — 80048 BASIC METABOLIC PNL TOTAL CA: CPT

## 2017-09-16 PROCEDURE — 36415 COLL VENOUS BLD VENIPUNCTURE: CPT

## 2017-09-16 PROCEDURE — 63600175 PHARM REV CODE 636 W HCPCS: Performed by: HOSPITALIST

## 2017-09-16 RX ORDER — ZIPRASIDONE HYDROCHLORIDE 40 MG/1
40 CAPSULE ORAL NIGHTLY
Qty: 30 CAPSULE | Refills: 11 | Status: SHIPPED | OUTPATIENT
Start: 2017-09-16 | End: 2017-10-16

## 2017-09-16 RX ORDER — DIVALPROEX SODIUM 500 MG/1
500 TABLET, DELAYED RELEASE ORAL EVERY 8 HOURS
Qty: 90 TABLET | Refills: 11 | Status: SHIPPED | OUTPATIENT
Start: 2017-09-16 | End: 2017-09-16

## 2017-09-16 RX ORDER — INSULIN ASPART 100 [IU]/ML
12 INJECTION, SOLUTION INTRAVENOUS; SUBCUTANEOUS ONCE
Status: COMPLETED | OUTPATIENT
Start: 2017-09-16 | End: 2017-09-16

## 2017-09-16 RX ORDER — POTASSIUM CHLORIDE 20 MEQ/1
60 TABLET, EXTENDED RELEASE ORAL ONCE
Status: COMPLETED | OUTPATIENT
Start: 2017-09-16 | End: 2017-09-16

## 2017-09-16 RX ORDER — INSULIN ASPART 100 [IU]/ML
40 INJECTION, SUSPENSION SUBCUTANEOUS 2 TIMES DAILY WITH MEALS
Status: DISCONTINUED | OUTPATIENT
Start: 2017-09-16 | End: 2017-09-16 | Stop reason: HOSPADM

## 2017-09-16 RX ORDER — DIVALPROEX SODIUM 500 MG/1
500 TABLET, DELAYED RELEASE ORAL EVERY 8 HOURS
Qty: 90 TABLET | Refills: 11 | Status: SHIPPED | OUTPATIENT
Start: 2017-09-16 | End: 2017-10-16

## 2017-09-16 RX ORDER — CLONIDINE HYDROCHLORIDE 0.2 MG/1
0.2 TABLET ORAL 2 TIMES DAILY
Qty: 60 TABLET | Refills: 11 | Status: SHIPPED | OUTPATIENT
Start: 2017-09-16 | End: 2017-10-16

## 2017-09-16 RX ADMIN — POTASSIUM CHLORIDE 60 MEQ: 1500 TABLET, EXTENDED RELEASE ORAL at 12:09

## 2017-09-16 RX ADMIN — INSULIN ASPART 12 UNITS: 100 INJECTION, SOLUTION INTRAVENOUS; SUBCUTANEOUS at 12:09

## 2017-09-16 RX ADMIN — INSULIN ASPART 20 UNITS: 100 INJECTION, SUSPENSION SUBCUTANEOUS at 08:09

## 2017-09-16 RX ADMIN — CLONIDINE HYDROCHLORIDE 0.2 MG: 0.1 TABLET ORAL at 08:09

## 2017-09-16 RX ADMIN — DIVALPROEX SODIUM 500 MG: 250 TABLET, DELAYED RELEASE ORAL at 06:09

## 2017-09-16 RX ADMIN — INSULIN ASPART 8 UNITS: 100 INJECTION, SOLUTION INTRAVENOUS; SUBCUTANEOUS at 08:09

## 2017-09-16 RX ADMIN — INSULIN ASPART 6 UNITS: 100 INJECTION, SOLUTION INTRAVENOUS; SUBCUTANEOUS at 12:09

## 2017-09-16 NOTE — PROGRESS NOTES
Called [facility] and spoke to Chriss, manager. Pt needs transportation back to facility. Unable to provide transportation for a couple of hours. Residents transported to outside event and  will not return for a couple of hours. Only have one  on duty today. Asked to call back in 1 hour. Informed the patient an Dr. Gonzales of above.

## 2017-09-16 NOTE — NURSING
Patient instructed and demonstrated the proper technique on how to draw up insulin and inject subcutaneously into abdomen and leg.  Patient was able to verbalize and perform a return demonstration on how to properly draw up and inject insulin. All questions and concerns addressed.

## 2017-09-16 NOTE — NURSING
Patient remained free from falls, trauma, and injuries throughout shift. No complaints of pain, nausea, or vomiting. Medications administered as prescribed. Blood glucose controlled with scheduled and sliding scale insulin. IV removed; catheter intact. Discharge instructions, prescriptions, and future appointments given to patient. Educated patient on how to give himself insulin shots, reasons to return to hospital, and provided multiple handouts on diabetes; patient verbalized understanding. Discharge patient by walking him down to meet  Mr. PECK from [facility]. No acute distress noted.

## 2017-09-16 NOTE — PLAN OF CARE
Problem: Patient Care Overview  Goal: Plan of Care Review  Outcome: Ongoing (interventions implemented as appropriate)  Rosy Bennett, RN Registered Nurse Signed Med/Surg  Nursing          []Hide copied text  []Hover for attribution information  Pt has remained free from falls and/or signs of infection this shift. Pt was educated on insulin last night but was not receptive. Pt remained within his comfort zone. Will continue to monitor.

## 2017-09-16 NOTE — PLAN OF CARE
Problem: Patient Care Overview  Goal: Plan of Care Review  Outcome: Ongoing (interventions implemented as appropriate)  Monitored freq.throughout the shift.pt.resting at present with no complaints and no acute distress noted. Continues with diabetic education. Pt. administered novolog insulin under nsg .supervision for supper & tolerated well. Literature on diabetes,diet, insulin,etc.given to pt. to read & review.Emphasis on area of giving injections reviewed with pt.  Pt.insulin changed  & pt.needs instr.on drawing up & giving insulin with syringe & passed on to oncoming nurse.  Call light in reach. Report oncoming shift.

## 2017-09-16 NOTE — ASSESSMENT & PLAN NOTE
He was recently diagnosed with diabetes in the last couple days, so it is unclear what type of diabetes he is afflicted with.  Will obtain a CARMELINA-65 Ab, insulin, and C-peptide level to help elucidate his type of diabetes in order to guide outpatient therapy.   consulted Diabetes Education.HbA1c 11.2,need insulin at D/C time,spoked with nursing staff for insulin injection education.    Start 70/30 and monitor   Pt did not tolerate metformin

## 2017-09-16 NOTE — SUBJECTIVE & OBJECTIVE
Interval History: *pt getting instruction on insulin administration     Review of Systems   Constitutional: Negative for activity change and appetite change.   Respiratory: Negative for shortness of breath.    Cardiovascular: Negative for chest pain.   Gastrointestinal: Negative for nausea.   Musculoskeletal: Negative for myalgias.   Psychiatric/Behavioral: Negative for dysphoric mood, hallucinations, sleep disturbance and suicidal ideas.     Objective:     Vital Signs (Most Recent):  Temp: 98.6 °F (37 °C) (09/15/17 1930)  Pulse: 95 (09/15/17 1930)  Resp: 17 (09/15/17 1930)  BP: (!) 172/96 (09/15/17 1930)  SpO2: 96 % (09/15/17 1930) Vital Signs (24h Range):  Temp:  [98.3 °F (36.8 °C)-98.7 °F (37.1 °C)] 98.6 °F (37 °C)  Pulse:  [79-95] 95  Resp:  [17-18] 17  SpO2:  [96 %-100 %] 96 %  BP: ()/(54-96) 172/96     Weight: 126.3 kg (278 lb 7.1 oz)  Body mass index is 38.83 kg/m².    Intake/Output Summary (Last 24 hours) at 09/15/17 2132  Last data filed at 09/15/17 0600   Gross per 24 hour   Intake                0 ml   Output                0 ml   Net                0 ml      Physical Exam   Constitutional: He is oriented to person, place, and time. No distress.   HENT:   Head: Atraumatic.   Eyes: EOM are normal. Pupils are equal, round, and reactive to light.   Neck: Normal range of motion. Neck supple.   Cardiovascular: Normal rate and regular rhythm.    Pulmonary/Chest: Effort normal and breath sounds normal.   Abdominal: Soft. Bowel sounds are normal.   Musculoskeletal: Normal range of motion. He exhibits no edema or deformity.   Neurological: He is oriented to person, place, and time. No cranial nerve deficit.   Skin: Skin is warm and dry. Capillary refill takes more than 3 seconds. He is not diaphoretic.   Psychiatric: He has a normal mood and affect. His behavior is normal.       Significant Labs:   BMP:   Recent Labs  Lab 09/15/17  0346   *      K 2.9*      CO2 25   BUN 11   CREATININE 0.9    CALCIUM 8.7     POCT Glucose:   Recent Labs  Lab 09/15/17  1137 09/15/17  1628 09/15/17  2051   POCTGLUCOSE 317* 263* 257*       Significant Imaging: I have reviewed all pertinent imaging results/findings within the past 24 hours.

## 2017-09-16 NOTE — NURSING
Pt has remained free from falls and/or signs of infection this shift. Pt was educated on insulin last night but was not receptive. Pt remained within his comfort zone. Will continue to monitor.

## 2017-09-16 NOTE — PROGRESS NOTES
[facility] Hospital Medicine  Progress Note    Patient Name: ROSSI Miller  MRN: [id number]  Patient Class: IP- Inpatient   Admission Date: 9/12/2017  Length of Stay: 2 days  Attending Physician: Jina Gonzales MD  Primary Care Provider: [facility]        Subjective:     Principal Problem:Diabetic ketoacidosis without coma    HPI:  Mr. ROSSI Miller is a 32 y.o. male with essential hypertension, new-onset diabetes mellitus (HbA1c 11.2% Sept 2017), Bipolar 1 disorder, obesity (BMI 38.8), noncompliance with medication regimen, tobacco abuse, and history of heroin abuse who presents to [facility] ED with complaints of nausea & vomiting for two days.  He is minimally participatory in his interview so the information is obtained via chart review.  He was evaluated here twice in the last day or so with complaints of hyperglycemia per his PCP for which he was instructed to present to the ED.  He had been complaining of polydipsia and polyuria for four days.  He also had a headache.  He had some nausea with vomiting.  He left twice before against medical advice.  Further history is limited at this time.    Hospital Course:  Mr. ROSSI Miller is a 32 y.o. male with essential hypertension, new-onset diabetes mellitus (HbA1c 11.2% Sept 2017),schizophrenia, Bipolar 1 disorder, obesity (BMI 38.8), noncompliance with medication regimen, tobacco abuse, and history of heroin abuse who presents to [facility] ED with complaints of nausea & vomiting for two days, He was evaluated here twice in the last day or so with complaints of hyperglycemia per his PCP for which he was instructed to present to the ED. He had been complaining of polydipsia and polyuria for four days. He had some nausea with vomiting. He left twice before against medical advice.patient is admitted for DKA,was on insulin gtt  AG closed,he has been started on SQ insulin,but still is acidotic,changed NS to strile water,with bicarb,psychitary has been  consulted and started on depakote and geodon,need follow with mental [facility] clinic as out patient.DM education has bene provided.he was taking only metformin,need insuli at D/C time with HbA1C of 11.2.    Cannot afford levemir and novolog, switched to 70/30 which is $25 at [facility] and instruction on syringe administation. Replacing potassium.    Interval History: *pt getting instruction on insulin administration     Review of Systems   Constitutional: Negative for activity change and appetite change.   Respiratory: Negative for shortness of breath.    Cardiovascular: Negative for chest pain.   Gastrointestinal: Negative for nausea.   Musculoskeletal: Negative for myalgias.   Psychiatric/Behavioral: Negative for dysphoric mood, hallucinations, sleep disturbance and suicidal ideas.     Objective:     Vital Signs (Most Recent):  Temp: 98.6 °F (37 °C) (09/15/17 1930)  Pulse: 95 (09/15/17 1930)  Resp: 17 (09/15/17 1930)  BP: (!) 172/96 (09/15/17 1930)  SpO2: 96 % (09/15/17 1930) Vital Signs (24h Range):  Temp:  [98.3 °F (36.8 °C)-98.7 °F (37.1 °C)] 98.6 °F (37 °C)  Pulse:  [79-95] 95  Resp:  [17-18] 17  SpO2:  [96 %-100 %] 96 %  BP: ()/(54-96) 172/96     Weight: 126.3 kg (278 lb 7.1 oz)  Body mass index is 38.83 kg/m².    Intake/Output Summary (Last 24 hours) at 09/15/17 2132  Last data filed at 09/15/17 0600   Gross per 24 hour   Intake                0 ml   Output                0 ml   Net                0 ml      Physical Exam   Constitutional: He is oriented to person, place, and time. No distress.   HENT:   Head: Atraumatic.   Eyes: EOM are normal. Pupils are equal, round, and reactive to light.   Neck: Normal range of motion. Neck supple.   Cardiovascular: Normal rate and regular rhythm.    Pulmonary/Chest: Effort normal and breath sounds normal.   Abdominal: Soft. Bowel sounds are normal.   Musculoskeletal: Normal range of motion. He exhibits no edema or deformity.   Neurological: He is oriented to person,  place, and time. No cranial nerve deficit.   Skin: Skin is warm and dry. Capillary refill takes more than 3 seconds. He is not diaphoretic.   Psychiatric: He has a normal mood and affect. His behavior is normal.       Significant Labs:   BMP:   Recent Labs  Lab 09/15/17  0346   *      K 2.9*      CO2 25   BUN 11   CREATININE 0.9   CALCIUM 8.7     POCT Glucose:   Recent Labs  Lab 09/15/17  1137 09/15/17  1628 09/15/17  2051   POCTGLUCOSE 317* 263* 257*       Significant Imaging: I have reviewed all pertinent imaging results/findings within the past 24 hours.    Assessment/Plan:      * Diabetic ketoacidosis without coma    Patient was with evidence of DKA given hyperglycemia, high anion gap metabolic acidosis, glucosuria, ketonuria, and elevated beta-hydroxybutyric acid.  Will first administer a regular insulin bolus and start weight-based insulin infusion until the anion-gap returns to normal and blood glucose is stabilized.  Will check hourly AccuCheks and check BMPs every 4 hours.  Will replace potassium as necessary.  Will start basal-bolus insulin therapy thereafter as well as an oral diet.  Etiology appears to be new-onset diabetes.  DKA resolved,on SQ insulin,but still is very acidotic,D/C NS,started on sterile water with bicarb,consulted SW for insulin arrangement and evaluation.        Hypokalemia    Secondary to insulin and now replacing orally           Borderline intellectual functioning    Will monitor,need follow as out patient with his mental health clinic in [place],DM education has been consulted.          Heroin use disorder, severe, in early remission    Stable; there are no acute issues.        Tobacco abuse    Patient was counseled on smoking cessation and he will be provided a nicotine transdermal patch applied while inpatient.  Will provide additional smoking cessation counseling prior to discharge.        Noncompliance with medication regimen    He has been counseled and  encouraged to adhere to his medication regimen,DM education, but exhibits poor insight into his medical condition.          Undifferentiated schizophrenia    He exhibits behaviors of paranoia and delusions , Psychiatry consulted and started on Geodon and Depakote.he say he was on geodon at home,but he is a poor historian.        Diabetes mellitus, new onset    He was recently diagnosed with diabetes in the last couple days, so it is unclear what type of diabetes he is afflicted with.  Will obtain a CARMELINA-65 Ab, insulin, and C-peptide level to help elucidate his type of diabetes in order to guide outpatient therapy.   consulted Diabetes Education.HbA1c 11.2,need insulin at D/C time,spoked with nursing staff for insulin injection education.    Start 70/30 and monitor   Pt did not tolerate metformin         Essential hypertension    Patient's blood pressure is poorly-controlled.  He is on clonidine but I doubt he is compliant with this.  Will provide as-needed clonidine.        Hyperkalemia    His hyperkalemia is likely due to both the transcellular potassium shifts often seen in DKA and acute renal failure.  He is already on an insulin infusion which will likely resolve his hyperkalemia.  Will check BNPs every 4 hours to assure resolution.  Resolved.        Acute renal failure    This is Mr. Miller's third visit to the ED and his renal function has been worsening with each visit; I suspect that this is due to a pre-renal etiology such as osmotic diuresis from DKA.  Patient's urinalysis is significant for a specific gravity of 1.020, 1+ protein, 3+ glucose, 2+ ketones, and 2+ occult blood.  Urine output has been poor.  Will obtain additional urine studies; provide aggressive IV fluid hydration; monitor the urine output; recheck the renal function in the morning; and avoid nephrotoxins.  ARF resolved.          VTE Risk Mitigation         Ordered     enoxaparin injection 40 mg  Daily     Route:  Subcutaneous        09/13/17  1332     Medium Risk of VTE  Once      09/13/17 0301              Jina Gonzales MD  Department of Hospital Medicine   [facility]

## 2017-09-17 NOTE — DISCHARGE SUMMARY
[facility] Hospital Medicine  Discharge Summary      Patient Name: ROSSI Miller  MRN: [id number]  Admission Date: 9/12/2017  Hospital Length of Stay: 3 days  Discharge Date and Time: 9/16/2017  Attending Physician: Jina Gonzales   Discharging Provider: Jina Gonzales MD  Primary Care Provider: [facility]      HPI:   Mr. ROSSI Miller is a 32 y.o. male with essential hypertension, new-onset diabetes mellitus (HbA1c 11.2% Sept 2017), Bipolar 1 disorder, obesity (BMI 38.8), noncompliance with medication regimen, tobacco abuse, and history of heroin abuse who presents to [facility] ED with complaints of nausea & vomiting for two days.  He is minimally participatory in his interview so the information is obtained via chart review.  He was evaluated here twice in the last day or so with complaints of hyperglycemia per his PCP for which he was instructed to present to the ED.  He had been complaining of polydipsia and polyuria for four days.  He also had a headache.  He had some nausea with vomiting.  He left twice before against medical advice.  Further history is limited at this time.    * No surgery found *      Indwelling Lines/Drains at time of discharge:   Lines/Drains/Airways          No matching active lines, drains, or airways        Hospital Course:   Mr. ROSSI Miller is a 32 y.o. male with essential hypertension, new-onset diabetes mellitus (HbA1c 11.2% Sept 2017),schizophrenia, Bipolar 1 disorder, obesity (BMI 38.8), noncompliance with medication regimen, tobacco abuse, and history of heroin abuse who presents to [facility] ED with complaints of nausea & vomiting for two days, He was evaluated here twice in the last day or so with complaints of hyperglycemia per his PCP for which he was instructed to present to the ED. He had been complaining of polydipsia and polyuria for four days. He had some nausea with vomiting. He left twice before against medical advice.patient is admitted for DKA,was on  insulin gtt  AG closed,he has been started on SQ insulin,but still is acidotic,changed NS to strile water,with bicarb,psychitary has been consulted and started on depakote and geodon,need follow with mental [facility] clinic as out patient.DM education has bene provided.he was taking only metformin,need insuli at D/C time with HbA1C of 11.2.    Cannot afford levemir and novolog, switched to 70/30 and relion R which is $25 at [facility] and instruction on syringe administation. Replacing potassium and ok for dc to [place] [facility].     Consults:   Consults         Status Ordering Provider     Inpatient consult to Dietary  Once     Provider:  (Not yet assigned)    Completed ALY BLOCK     Inpatient consult to Psychiatry  Once     Provider:  Hunter Cano MD    Completed DANIELA LABOY            Pending Diagnostic Studies:     None        Final Active Diagnoses:    Diagnosis Date Noted POA    Hypokalemia [E87.6] 09/15/2017 No    Essential hypertension [I10] 09/13/2017 Yes     Chronic    Diabetes mellitus, new onset [E11.9] 09/13/2017 Yes    Undifferentiated schizophrenia [F20.3] 09/13/2017 Yes    Noncompliance with medication regimen [Z91.14] 09/13/2017 Not Applicable     Chronic    Tobacco abuse [Z72.0] 09/13/2017 Yes     Chronic    Heroin use disorder, severe, in early remission [F11.21] 09/13/2017 Yes    Borderline intellectual functioning [R41.83] 09/13/2017 Not Applicable      Problems Resolved During this Admission:    Diagnosis Date Noted Date Resolved POA    PRINCIPAL PROBLEM:  Diabetic ketoacidosis without coma [E13.10] 09/13/2017 09/16/2017 Yes    Acute renal failure [N17.9] 09/13/2017 09/16/2017 Yes    Hyperkalemia [E87.5] 09/13/2017 09/16/2017 Yes      No new Assessment & Plan notes have been filed under this hospital service since the last note was generated.  Service: Hospital Medicine      Discharged Condition: good    Disposition: Home or Self Care    Follow Up:  Follow-up Information      [facility] On 9/18/2017.    Specialties:  Behavioral Health, Psychiatry  Why:  10 am (Monday). Arrive 15 minutes early, bring your discharge summary, ALL your medications, your insurance card and picture ID  Contact information:  [address]  [place] [address]  [phone]             [facility].    Specialties:  Behavioral Health, Psychiatry, Psychology  Contact information:  [address] [id number] [phone]                 Patient Instructions:     Diet Diabetic 2000 Calories     Activity as tolerated     Call MD for:  severe uncontrolled pain     Call MD for:  persistent nausea and vomiting or diarrhea       Medications:  Reconciled Home Medications:   Discharge Medication List as of 9/16/2017  1:10 PM      START taking these medications    Details   insulin aspart (NOVOLOG) 100 unit/mL InPn pen Inject 10 Units into the skin 3 (three) times daily., Starting Fri 9/15/2017, Until Sun 10/15/2017, Print      insulin detemir (LEVEMIR FLEXTOUCH) 100 unit/mL (3 mL) SubQ InPn pen Inject 35 Units into the skin every evening., Starting Fri 9/15/2017, Until Sun 10/15/2017, Print      insulin NPH-insulin regular, 70/30, (NOVOLIN 70/30) 100 unit/mL (70-30) injection Inject 40 Units into the skin 2 (two) times daily., Starting Sat 9/16/2017, Until Mon 10/16/2017, Print      insulin regular (NOVOLIN R) 100 unit/mL Inj injection Blood Glucose  mg/dL                  Pre-meal                Bedtime  151-200                2 units                    1 unit  201-250                4 units                    2 units   251-300                6 units                    3 units   301-3 50                8 units                    4 units   >350                     10 units                  5 units, Print      ziprasidone (GEODON) 40 MG Cap Take 1 capsule (40 mg total) by mouth every evening., Starting Sat 9/16/2017, Until Mon 10/16/2017, Print         CONTINUE these medications  which have CHANGED    Details   cloNIDine (CATAPRES) 0.2 MG tablet Take 1 tablet (0.2 mg total) by mouth 2 (two) times daily., Starting Sat 9/16/2017, Until Mon 10/16/2017, Normal      divalproex (DEPAKOTE) 500 MG TbEC Take 1 tablet (500 mg total) by mouth every 8 (eight) hours., Starting Sat 9/16/2017, Until Mon 10/16/2017, Print         STOP taking these medications       metformin (FORTAMET) 500 mg 24 hr tablet Comments:   Reason for Stopping:             Time spent on the discharge of patient: 40 minutes    HOS POC IP DISCHARGE SUMMARY    Jina Gonzales MD  Department of Hospital Medicine  [facility]

## 2017-09-24 ENCOUNTER — NURSE TRIAGE (OUTPATIENT)
Dept: ADMINISTRATIVE | Facility: CLINIC | Age: 32
End: 2017-09-24

## 2017-09-24 ENCOUNTER — HOSPITAL ENCOUNTER (EMERGENCY)
Facility: HOSPITAL | Age: 32
Discharge: HOME | End: 2017-09-24
Attending: EMERGENCY MEDICINE
Payer: MEDICAID

## 2017-09-24 VITALS
BODY MASS INDEX: 40.6 KG/M2 | TEMPERATURE: 99 F | HEIGHT: 71 IN | HEART RATE: 90 BPM | RESPIRATION RATE: 20 BRPM | SYSTOLIC BLOOD PRESSURE: 156 MMHG | WEIGHT: 290 LBS | OXYGEN SATURATION: 99 % | DIASTOLIC BLOOD PRESSURE: 92 MMHG

## 2017-09-24 DIAGNOSIS — K52.9 GASTROENTERITIS: ICD-10-CM

## 2017-09-24 DIAGNOSIS — R74.8 ELEVATED LIPASE: Primary | ICD-10-CM

## 2017-09-24 LAB
ALBUMIN SERPL BCP-MCNC: 4 G/DL
ALP SERPL-CCNC: 84 U/L
ALT SERPL W/O P-5'-P-CCNC: 26 U/L
ANION GAP SERPL CALC-SCNC: 13 MMOL/L
AST SERPL-CCNC: 28 U/L
BASOPHILS # BLD AUTO: 0.03 K/UL
BASOPHILS NFR BLD: 0.2 %
BILIRUB SERPL-MCNC: 0.4 MG/DL
BUN SERPL-MCNC: 10 MG/DL
CALCIUM SERPL-MCNC: 10.1 MG/DL
CHLORIDE SERPL-SCNC: 99 MMOL/L
CO2 SERPL-SCNC: 24 MMOL/L
CREAT SERPL-MCNC: 1.2 MG/DL
DIFFERENTIAL METHOD: ABNORMAL
EOSINOPHIL # BLD AUTO: 0 K/UL
EOSINOPHIL NFR BLD: 0.1 %
ERYTHROCYTE [DISTWIDTH] IN BLOOD BY AUTOMATED COUNT: 13.7 %
EST. GFR  (AFRICAN AMERICAN): >60 ML/MIN/1.73 M^2
EST. GFR  (NON AFRICAN AMERICAN): >60 ML/MIN/1.73 M^2
GLUCOSE SERPL-MCNC: 193 MG/DL
HCT VFR BLD AUTO: 45.3 %
HGB BLD-MCNC: 15.6 G/DL
LIPASE SERPL-CCNC: 167 U/L
LYMPHOCYTES # BLD AUTO: 1.9 K/UL
LYMPHOCYTES NFR BLD: 12.5 %
MCH RBC QN AUTO: 30.6 PG
MCHC RBC AUTO-ENTMCNC: 34.4 G/DL
MCV RBC AUTO: 89 FL
MONOCYTES # BLD AUTO: 1.1 K/UL
MONOCYTES NFR BLD: 7.4 %
NEUTROPHILS # BLD AUTO: 11.9 K/UL
NEUTROPHILS NFR BLD: 79.6 %
PLATELET # BLD AUTO: 569 K/UL
PMV BLD AUTO: 11.2 FL
POCT GLUCOSE: 206 MG/DL (ref 70–110)
POTASSIUM SERPL-SCNC: 4.7 MMOL/L
PROT SERPL-MCNC: 9.1 G/DL
RBC # BLD AUTO: 5.1 M/UL
SODIUM SERPL-SCNC: 136 MMOL/L
WBC # BLD AUTO: 14.91 K/UL

## 2017-09-24 PROCEDURE — 80053 COMPREHEN METABOLIC PANEL: CPT

## 2017-09-24 PROCEDURE — 82962 GLUCOSE BLOOD TEST: CPT

## 2017-09-24 PROCEDURE — 99283 EMERGENCY DEPT VISIT LOW MDM: CPT | Mod: 25

## 2017-09-24 PROCEDURE — 96361 HYDRATE IV INFUSION ADD-ON: CPT

## 2017-09-24 PROCEDURE — 85025 COMPLETE CBC W/AUTO DIFF WBC: CPT

## 2017-09-24 PROCEDURE — 83690 ASSAY OF LIPASE: CPT

## 2017-09-24 PROCEDURE — 96374 THER/PROPH/DIAG INJ IV PUSH: CPT

## 2017-09-24 PROCEDURE — 25000003 PHARM REV CODE 250: Performed by: PHYSICIAN ASSISTANT

## 2017-09-24 PROCEDURE — 63600175 PHARM REV CODE 636 W HCPCS: Performed by: PHYSICIAN ASSISTANT

## 2017-09-24 RX ORDER — METFORMIN HYDROCHLORIDE 500 MG/1
500 TABLET ORAL 2 TIMES DAILY WITH MEALS
COMMUNITY

## 2017-09-24 RX ORDER — METOCLOPRAMIDE HYDROCHLORIDE 5 MG/ML
5 INJECTION INTRAMUSCULAR; INTRAVENOUS
Status: COMPLETED | OUTPATIENT
Start: 2017-09-24 | End: 2017-09-24

## 2017-09-24 RX ORDER — ONDANSETRON 4 MG/1
4 TABLET, ORALLY DISINTEGRATING ORAL EVERY 6 HOURS PRN
Qty: 15 TABLET | Refills: 0 | Status: SHIPPED | OUTPATIENT
Start: 2017-09-24

## 2017-09-24 RX ORDER — KETOROLAC TROMETHAMINE 10 MG/1
10 TABLET, FILM COATED ORAL 3 TIMES DAILY PRN
Qty: 15 TABLET | Refills: 0 | Status: SHIPPED | OUTPATIENT
Start: 2017-09-24

## 2017-09-24 RX ADMIN — SODIUM CHLORIDE 1000 ML: 0.9 INJECTION, SOLUTION INTRAVENOUS at 11:09

## 2017-09-24 RX ADMIN — METOCLOPRAMIDE 5 MG: 5 INJECTION, SOLUTION INTRAMUSCULAR; INTRAVENOUS at 11:09

## 2017-09-24 NOTE — TELEPHONE ENCOUNTER
"  Reason for Disposition   High-risk adult (e.g., diabetes mellitus, brain tumor, V-P shunt, hernia)    Answer Assessment - Initial Assessment Questions  1. VOMITING SEVERITY: "How many times have you vomited in the past 24 hours?"      - MILD:  1 - 2 times/day     - MODERATE: 3 - 5 times/day, decreased oral intake without significant weight loss or symptoms of dehydration     - SEVERE: 6 or more times/day, vomits everything or nearly everything, with significant weight loss, symptoms of dehydration       Severe   2. ONSET: "When did the vomiting begin?"       Friday  3. FLUIDS: "What fluids or food have you vomited up today?" "Have you been able to keep any fluids down?"      Yes able to drink liquids  4. ABDOMINAL PAIN: "Are your having any abdominal pain?" If yes : "How bad is it and what does it feel like?" (e.g., crampy, dull, intermittent, constant)       Yes intermittent 5-6/10  5. DIARRHEA: "Is there any diarrhea?" If so, ask: "How many times today?"       Yes several   6. CONTACTS: "Is there anyone else in the family with the same symptoms?"       Denies  7. CAUSE: "What do you think is causing your vomiting?"      Diabetes medication  8. HYDRATION STATUS: "Any signs of dehydration?" (e.g., dry mouth [not only dry lips], too weak to stand) "When did you last urinate?"      Last urinated just now  9. OTHER SYMPTOMS: "Do you have any other symptoms?" (e.g., fever, headache, vertigo, vomiting blood or coffee grounds)      Denies  10. PREGNANCY: "Is there any chance you are pregnant?" "When was your last menstrual period?"        Na    [facility] [address]    Protocols used: ST VOMITING-A-AH    "

## 2017-09-24 NOTE — ED NOTES
"Patient becoming agitated and stating " I'm about to leave outta here", and stepping out of room.  "

## 2017-09-24 NOTE — ED NOTES
"Pt becoming verbally abusive, angry, loud, cursing, upon seeing me calling another pt from ER lobby, pt yells" I see ya'll call people, I came before them!" upon my statement of " sir, there are two different parts to the ER, a [facility] like a urgent care and another area of the ER" pt does not allow me to finish sentence, stating " I dont' want want to hear that shit!!" ya'll always have something to say!! " fuck this shit!" " I been waiting here and i'm sick!"" fuck this shit, fuck you bitch!" this outburst is in front of the other patients in the lobby as well as the person that I called to escort to room, pt instructed to calm down and to refrained from using such language, to respect the other individuals in the lobby, noted, pt becoming even , angrier, yelling louder, cursing even louder, ER tech Verena noted quickly deescalating the situation and quickly stands up and tells this pt, " come on come on, lets go outside, come on come on" pt follows verena outside of ER, security contacted, I apologize to the patient family that I had originally called from lobby to bring to ER room  and that were standing witnessing such foul language, such display of anger from this pt.    "

## 2017-09-24 NOTE — ED PROVIDER NOTES
"Encounter Date: 9/24/2017       History     Chief Complaint   Patient presents with    Emesis     "I keep throwing up, bout 2 days, my head (headache). I got sugar" hx of DM     32-year-old male with pmh asthma, HTN, DM, obesity, with chief complaint abdominal pain and emesis ×2 days.  Patient admits to emesis without hematemesis, 3-4 times per day over the past 2 days.  Patient admits to loose stools yesterday, ×3-4 times, he denies bright red blood per rectum or black tarry stools.  He denies urinary complaints. He admits to upper abdominal pain, which is constant. He admits to decreased PO intake. No fever or chills. No radiation of pain. Symptoms constant. No alleviating or exacerbating factors.           Review of patient's allergies indicates:  No Known Allergies  Past Medical History:   Diagnosis Date    Asthma     pt states he "grew out of it" and no longer has asthma.    Diabetes mellitus     History of psychiatric hospitalization     Hx of psychiatric care     Hypertension     Obese     Psychiatric problem     Therapy      Past Surgical History:   Procedure Laterality Date    FRACTURE SURGERY      ankle     Family History   Problem Relation Age of Onset    Diabetes Paternal Uncle      Social History   Substance Use Topics    Smoking status: Current Every Day Smoker     Types: Cigarettes    Smokeless tobacco: Never Used    Alcohol use No     Review of Systems   Constitutional: Negative.  Negative for chills, diaphoresis and fever.   HENT: Negative for sore throat.    Respiratory: Negative for chest tightness and shortness of breath.    Cardiovascular: Negative for chest pain.   Gastrointestinal: Positive for abdominal pain, diarrhea, nausea and vomiting. Negative for constipation.   Genitourinary: Negative for dysuria, hematuria, penile pain and testicular pain.   Musculoskeletal: Negative for back pain.   Skin: Negative for rash.   Neurological: Negative for dizziness, weakness, " light-headedness and numbness.   Hematological: Does not bruise/bleed easily.   All other systems reviewed and are negative.      Physical Exam     Initial Vitals [09/24/17 0926]   BP Pulse Resp Temp SpO2   (!) 166/96 88 20 99.1 °F (37.3 °C) 99 %      MAP       119.33         Physical Exam    Nursing note and vitals reviewed.  Constitutional: He appears well-developed and well-nourished. He is not diaphoretic. No distress.   HENT:   Head: Normocephalic and atraumatic.   Eyes: Conjunctivae and EOM are normal. Pupils are equal, round, and reactive to light.   Neck: Normal range of motion. Neck supple.   Cardiovascular: Normal heart sounds and intact distal pulses.   No murmur heard.  Pulmonary/Chest: Breath sounds normal. No respiratory distress. He has no wheezes. He has no rhonchi. He exhibits no tenderness.   Abdominal: Soft. Bowel sounds are normal.   Abdomen overall soft, bowel sounds ×4.  Mild TTP midepigastric region, no rebound, no guarding.  No CVA tenderness.  Negative Asencio's, negative McBurney's.   Musculoskeletal: Normal range of motion. He exhibits no tenderness.   Neurological: He is alert and oriented to person, place, and time. He has normal strength.   Skin: Skin is warm and dry. Capillary refill takes less than 2 seconds. No rash and no abscess noted. No erythema.   Psychiatric: He has a normal mood and affect. His behavior is normal. Judgment and thought content normal.         ED Course   Procedures  Labs Reviewed   POCT GLUCOSE - Abnormal; Notable for the following:        Result Value    POCT Glucose 206 (*)     All other components within normal limits   CBC W/ AUTO DIFFERENTIAL   COMPREHENSIVE METABOLIC PANEL   LIPASE   URINALYSIS   POCT GLUCOSE MONITORING CONTINUOUS             Medical Decision Making:   Initial Assessment:   32-year-old male chief complaint upper abdominal pain, nausea, vomiting, diarrhea ×2 days.  No fever.  Differential Diagnosis:   Gastroenteritis, pancreatitis,  cholecystitis, cholangitis, choledocholithiasis, GERD, PID, gastritis  Clinical Tests:   Lab Tests: Ordered and Reviewed  ED Management:  Patient overall well-appearing, in no acute distress, afebrile, vitals within normal limits.    Patient is to nausea and vomiting, diarrhea ×2 days.  He denies fever.  He states he cannot tolerate by mouth, that everything comes up.  He is well-appearing, not hypotensive or tachycardic.  There is in mild tenderness to palpation to midepigastric region.  Remainder of abdominal exam is benign.  No guarding, no rebound, no peritoneal signs.  Patient recently hospitalized approximately 12 days ago for DKA.  Today, bedside glucose 204.  I do not suspect DKA or HHS at this time.  Patient states he recently restarted metformin, and suspects this may be the cause of his discomfort.  I will get some lab work and rehydrate.    While awaiting fluids, patient became disgruntled, uncooperative.  We were able to cajole him into staying for fluids.  He feels much better after Reglan.  Patient refused to provide urine sample.  Because we weren't down time, I did not get the results of CBC.  I do not suspect infectious process or anemia at this time.  CMP grossly normal.  Lipase is elevated at 167, which has acutely risen from 19 approximately 12 days ago.  Repeat abdominal exam is benign.  No significant tenderness to palpation of midepigastric region.  I do not suspect acute pancreatitis at this time.  However, I did give the patient strict return precautions, and have encouraged by mouth hydration with clear liquids.  He does seem to understand.  I've asked him to follow-up with his primary care physician this week for reevaluation.  I've also asked him to return to this ED if any problems occur.  He does understand and agree with treatment plan.  Other:   I have discussed this case with another health care provider.       <> Summary of the Discussion: I have discussed this case with .                     ED Course      Clinical Impression:   The primary encounter diagnosis was Elevated lipase. A diagnosis of Gastroenteritis was also pertinent to this visit.    Disposition:   Disposition: Discharged  Condition: Stable                        Jj Heard PA-C  09/24/17 2509

## 2017-09-26 LAB — POCT GLUCOSE: 201 MG/DL (ref 70–110)
